# Patient Record
Sex: FEMALE | Race: WHITE | Employment: OTHER | URBAN - METROPOLITAN AREA
[De-identification: names, ages, dates, MRNs, and addresses within clinical notes are randomized per-mention and may not be internally consistent; named-entity substitution may affect disease eponyms.]

---

## 2017-04-19 ENCOUNTER — HOSPITAL ENCOUNTER (OUTPATIENT)
Dept: PHYSICAL THERAPY | Age: 62
Discharge: HOME | End: 2017-04-19
Payer: MEDICARE

## 2017-04-19 PROCEDURE — 97162 PT EVAL MOD COMPLEX 30 MIN: CPT

## 2017-04-19 PROCEDURE — 97140 MANUAL THERAPY 1/> REGIONS: CPT

## 2017-04-19 PROCEDURE — 97110 THERAPEUTIC EXERCISES: CPT

## 2017-04-19 PROCEDURE — G8978 MOBILITY CURRENT STATUS: HCPCS

## 2017-04-19 PROCEDURE — G8979 MOBILITY GOAL STATUS: HCPCS

## 2017-04-19 NOTE — PROGRESS NOTES
Ambulatory/Rehab Services H2 Model Falls Risk Assessment    Risk Factor Pts. ·   Confusion/Disorientation/Impulsivity  []    4 ·   Symptomatic Depression  []   2 ·   Altered Elimination  []   1 ·   Dizziness/Vertigo  []   1 ·   Gender (Male)  []   1 ·   Any administered antiepileptics (anticonvulsants):  []   2 ·   Any administered benzodiazepines:  []   1 ·   Visual Impairment (specify):  []   1 ·   Portable Oxygen Use  []   1 ·   Orthostatic ? BP  []   1 ·   History of Recent Falls (within 3 mos.)  []   5     Ability to Rise from Chair (choose one) Pts. ·   Ability to rise in a single movement  []   0 ·   Pushes up, successful in one attempt  [x]   1 ·   Multiple attempts, but successful  []   3 ·   Unable to rise without assistance  []   4   Total: (5 or greater = High Risk) 1     Falls Prevention Plan:   []                Physical Limitations to Exercise (specify):   []                Mobility Assistance Device (type):   []                Exercise/Equipment Adaptation (specify):    ©2010 [facility] Patent #[id number].  Federal Law prohibits the replication, distribution or use without written permission from [facility]

## 2017-04-19 NOTE — PROGRESS NOTES
Rina Westbrook  : 1955 [facility] [place] [address] [address].  [phone]   [phone]        OUTPATIENT PHYSICAL THERAPY:Initial Assessment 2017      ICD-10: Treatment Diagnosis:  Difficulty in walking, not elsewhere classified (R26.2)  Foot drop, right foot (M21.371)  Precautions/Allergies:   Zanaflex [tizanidine]   Fall Risk Score: 1 (? 5 = High Risk)  MD Orders: Evaluate and Treat MEDICAL/REFERRING DIAGNOSIS:  Encounter for other specified aftercare [Z51.89]  Cerebral aneurysm [I67.1]   DATE OF ONSET: chronic  REFERRING PHYSICIAN: Becca Estevez MD  RETURN PHYSICIAN APPOINTMENT: TBA     INITIAL ASSESSMENT:  Ms. Divya Gilmore (pt) is a 63year old white female seen for physical therapy per MD orders with complaint of decreased balance, decreased strength R foot and antalgic gait. .  Pt evaluated for outpatient physical therapy today with the following deficits: decreased strength/ ROM R foot, decreased balance and antalgic gait. Pt would benefit from physical therapy to address the above deficits in order to return to increased independence with functional mobility safely with improved balance. Pt would benefit from working in aquatic setting to address goals. Also, plan to transition to gravity challenging, land based exercises/ activities. Plan of care and goals reviewed with patient who verbalizes agreement. PROBLEM LIST (Impacting functional limitations):  1. Decreased Strength  2. Decreased Ambulation Ability/Technique  3. Decreased Balance  4. Decreased Flexibility/Joint Mobility INTERVENTIONS PLANNED:  1. Balance Exercise  2. Gait Training  3. Home Exercise Program (HEP)  4. Manual Therapy  5. Neuromuscular Re-education/Strengthening  6. Range of Motion (ROM)  7. Therapeutic Activites  8. Therapeutic Exercise/Strengthening  9. modalities   10. Aquatics   TREATMENT PLAN:  Effective Dates: 17 TO 17. Frequency/Duration:1-2 times a week for 12 weeks. GOALS: (Goals have been discussed and agreed upon with patient.)  Short-Term Functional Goals: Time Frame: 2 weeks  Patient will demonstrate independence and compliance with home exercise program for management of symptoms. Pt will demonstrate increase in Lower Extremity Functional Scale by 2-3 points for improved functional mobility. Pt will tolerate 35 to 40 minutes of aquatic therapy to address strength and balance right foot. Discharge Goals: Time Frame: 8 weeks  Pt will report Lower Extremity Functional Scale score of 51/80 for improved function. Pt will demonstrate improved active range of motion of right ankle dorsiflexion by 5 ° for normalized heel strike in gait sequence. Pt will ambulate independently  >/= 1000 feet with improved gait pattern- equal stance time and R LE heel strike at initial stance phase. Pt will demonstrate improved single leg stance on RLE by 4 to 6 secs for improved safety and falls prevention. Pt will demonstrate reciprocal gait up/ down 10-12 steps with independently with even stance time with use of unilateral handrail. Rehabilitation Potential For Stated Goals: Good  Regarding Jessa Lute therapy, I certify that the treatment plan above will be carried out by a therapist or under their direction. Thank you for this referral,  Shila Lacy, PT       Referring Physician Signature: Otto Saba MD              Date                      HISTORY:   History of Present Injury/Illness (Reason for Referral):  Pt is 62 y/o WF seen for PT per MD orders following brain aneurysm during surgical procedure in 2014 with significant residual R side paralysis/ limitations. PT had been seen for physical therapy and is familiar to this therapist for extensive rehab addressing foot drop/ gait in spring/ summer 2016.   Within a couple of weeks of therapy D/C in 2016 pt fell while taking dog out to bathroom, fracturing first three toes on R foot. Since her initial cerebral event in 2014 pt has been wearing some semblance of brace to help reduce foot drop and reports she is being evaluated for orthotic/ brace again in May. Pt reports her goals include being able to walk \"great\" to run, to dance and balance. Past Medical History/Comorbidities:   Ms. Divya Gilmore  has a past medical history of Anxiety disorder; Back pain; Brain aneurysm; Cerebral hemorrhage ([facility] [address] History of drug dependence ([facility] [address] History of vitamin D deficiency; Hypercholesterolemia; Joint pain; Personal history of colonic polyps; Stroke [facility]); and Unspecified late effects of cerebrovascular disease. She also has no past medical history of Adverse effect of anesthesia; Difficult intubation; Malignant hyperthermia due to anesthesia; Nausea & vomiting; or Pseudocholinesterase deficiency. Ms. Divya Gilmore  has a past surgical history that includes suzette and bso (1990); intracranial aneurysm repair (2014); colonoscopy; and knee arthroscopy. Social History/Living Environment:     Pt lives with  in 2 story home. Prior Level of Function/Work/Activity:  Previous history of therapy in spring/ summer 2016. Walking with significant foot drop (without ankle brace intact) without use of assistive device except in unfamiliar surroundings  Dominant Side:         LEFT  Current Medications:    Current Outpatient Prescriptions:     HYDROcodone-acetaminophen (NORCO) 7.5-325 mg per tablet, Take 1 Tab by mouth two (2) times a day. Max Daily Amount: 2 Tabs., Disp: 60 Tab, Rfl: 0    [START ON 5/4/2017] HYDROcodone-acetaminophen (NORCO) 7.5-325 mg per tablet, Take 1 Tab by mouth two (2) times a day. Max Daily Amount: 2 Tabs., Disp: 60 Tab, Rfl: 0    [START ON 6/4/2017] HYDROcodone-acetaminophen (NORCO) 7.5-325 mg per tablet, Take 1 Tab by mouth two (2) times a day. Max Daily Amount: 2 Tabs., Disp: 60 Tab, Rfl: 0    atorvastatin (LIPITOR) 20 mg tablet, Take 1 Tab by mouth daily.  Indications: hypercholesterolemia, Disp: 90 Tab, Rfl: 3    alendronate (FOSAMAX) 70 mg tablet, Take 1 Tab by mouth every seven (7) days. Indications: POST-MENOPAUSAL OSTEOPOROSIS, Disp: 12 Tab, Rfl: 3    gabapentin (NEURONTIN) 400 mg capsule, Take 1 Cap by mouth three (3) times daily. , Disp: 270 Cap, Rfl: 3    buPROPion XL (WELLBUTRIN XL) 150 mg tablet, Take 1 Tab by mouth every morning., Disp: 90 Tab, Rfl: 0    cholecalciferol (VITAMIN D3) 1,000 unit cap, Take 1 Cap by mouth nightly. Indications: VITAMIN D DEFICIENCY, Disp: , Rfl:     diclofenac (VOLTAREN) 1 % gel, Apply topically 4 (four) times a day. Apply 2 grams to painful areas up to four times daily, Disp: , Rfl:     Multivit-Iron-Min-Folic Acid (CENTRUM COMPLETE) 18-0.4 mg tab, Take  by mouth.  Stop seven days prior to surgery per anesthesia protocol., Disp: , Rfl:    Date Last Reviewed:  4/19/2017   # of Personal Factors/Comorbidities that affect the Plan of Care: 1-2: MODERATE COMPLEXITY   EXAMINATION:   Observation/Orthostatic Postural Assessment:          Pt of petite stature  Palpation:          No tenderness noted in R foot/ ankle however tightness \"roping\" and tenderness in lateral R calf  ROM:  BUE/ cervical WFL                     Date:  04/19/17 Date:   Date:     Trunk ROM WFL                          LE ROM Left Right       Hip Flexion [facility]       Hip Abduction [facility]       Straight Leg Raise (SLR) 90 ° 90 °                Knee Flexion [facility] WFL       Knee Extension [facility]                Ankle Inversion 35 ° 35 °       Ankle Eversion 20 ° 7 °       Ankle Planar Flexion 50 ° 40 °       Ankle Dorsiflexion 20 ° 5 °**       ** compensated with eversion as wel  Strength:     Date:  04/19/17 Date:   Date:     LE MMT Left Right Left Right Left Right   Hip Flex (L1/L2) 4/5 4/5       Hip Abd (glut med) 4/5 4/5       Hip Ext 4/5 4/5       Quad    ( L3/4) 4/5 4/5       Hamstring 4/5 4/5       Anterior Tib (L4/L5) 4/5 2/5**       Gastroc (S1/2) 4/5 2/5**       EHL (L5) 4/5 2/5**       ** indicates limited by AROM limitations                    Special Tests:  deferred  Neurological Screen:  Noted R side UE and LE with limited volitional control particularly in R foot/ ankle        Sensation: intact for light touch  Functional Mobility:         Gait/Ambulation:  Pt ambulating without A.D during assessment with noted foot drop (when observed without brace on R foot)  Pt imitates stance phase with flat foot- no heel strike on R foot- with cues able to improve however still lands in inversion/ supination        Transfers:  Pt demonstrates sit to stand sit without use of hands from standard seat- supervised for increased effort and loading equally on BLE        Bed Mobility:  independent        Stairs:  Pt demonstrates reciprocal gait on stairs however not even wt bearing/ stance time. Balance:          Single Leg Stance:  R 2-3sec  L) 13 sec   Body Structures Involved:  1. Nerves  2. Joints  3. Muscles  4. Ligaments Body Functions Affected:  1. Neuromusculoskeletal  2. Movement Related Activities and Participation Affected:  1. General Tasks and Demands  2. Mobility  3. Self Care  4. Domestic Life  5. Community, Social and [place]   # of elements that affect the Plan of Care: 3: MODERATE COMPLEXITY   CLINICAL PRESENTATION:   Presentation: Evolving clinical presentation with changing clinical characteristics: MODERATE COMPLEXITY   CLINICAL DECISION MAKING:   Outcome Measure: Tool Used: Lower Extremity Functional Scale (LEFS)  Score:  Initial: 46/80 Most Recent: X/80 (Date: -- )   Interpretation of Score: 20 questions each scored on a 5 point scale with 0 representing \"extreme difficulty or unable to perform\" and 4 representing \"no difficulty\". The lower the score, the greater the functional disability. 80/80 represents no disability. Minimal detectable change is 9 points. Score 80 79-63 62-48 47-32 31-16 15-1 0   Modifier CH CI CJ CK CL CM CN     ?  Mobility - Walking and Moving Around:     - CURRENT STATUS: CK - 40%-59% impaired, limited or restricted    - GOAL STATUS: CJ - 20%-39% impaired, limited or restricted    - D/C STATUS:  ---------------To be determined---------------    Medical Necessity:   · Patient is expected to demonstrate progress in strength, range of motion and balance to increase independence with functional mobility safely. Reason for Services/Other Comments:  · Patient continues to require modification of therapeutic interventions to increase complexity of exercises. Use of outcome tool(s) and clinical judgement create a POC that gives a: Questionable prediction of patient's progress: MODERATE COMPLEXITY   TREATMENT:   (In addition to Assessment/Re-Assessment sessions the following treatments were rendered)    Therapeutic Exercise: (see flow sheet below for minutes):  Exercises per grid below to improve mobility, strength and balance. Required minimal visual and verbal cues to promote proper body alignment and promote proper body mechanics. Progressed resistance, range and repetitions as indicated. Aquatic Therapy (see flow sheet below for minutes): Aquatic treatment performed per flow grid for Decreased muscle strength, Decreased static/dynamic balance and reactive control and Ease of movement. Cues provided for technique. Assistance by therapist provided for progression of activities/ exercises. Patient has difficulty with R ankle/ balance. Date: 04/19/17       Modalities:                                Manual Therapy: 15 min       STM to heel cord R heel cord                       Aquatic Exercise:        Gait F/B/S/M        Heel/Toe walk        4-way        PF/DF        Hamstring Curls        Hip Flexion with knee ext.   (rockette)        Squats          SLR march        Lunges        Hip circles        Hip horizontal abduction/adduction        Core:          Core:        Deep well bike        Deep well jumping georgie Deep well scissors        Deep well:  traction                Hamstring Stretch        Step Lunge        Piriformis stretch        PF Stretch        Balance: Single leg Stance        Balance: Tandem                          Therapeutic Exercise: 10 min       Toe/ heel raises x15 BLE  x5 L/R with cues       Heel cord stretch 2H20 BLE at bar       Eversion Seated with Red TB                               F=forward  B=Backward  S=sidesteps  M=marches    H=hold    Manual: (see flow sheet above for minutes) Pt positioned in supine for STM to R heelcord and laterally to peroneals with significant tightness noted  Following STM worked on gait with slightly improved heel strike  Modalities: (see flow sheet above for minutes)     HEP: Addition of single leg heel/ toe raises BID as well as instructed in use of red TB for eversion exercises- based on previous therapy intervention pt has had these exercises in past however reports \"I have forgotten them\"    Treatment/Session Assessment:  Initial assessment completed as well as pt participated in standing/ seated exercise which pt is familiar with based on previous therapeutic intervention. Pt tolerated STM to R posterior lower leg with noted slight improvement in facilitation of R heel strike at initial stance phase. Plan to see patient to instructed in HEP/ exercises/ activities addressing strength, ROM and balance for safety and falls risk with functional mobility. Pt is very ambitious with goals for dancing, running etc considering she fell after her most recent therapy intervention at peak of strength and balance work. Will plan to address strength/ balance and ROM to improve safety with functional mobility in daily routine. Pt in agreement with this plan. · Pain/ Symptoms: Initial:   1/10 in ankle  3-4/10 in posterior R calf Post Session:  1/10 ·   Compliance with Program/Exercises: Will assess as treatment progresses.   · Recommendations/Intent for next treatment session: \"Next visit will focus on advancements to more challenging activities\". Plan to work on land at next session will work in aquatic setting PRN pending tolerance to work on strength/ balance on land.     Total Treatment Duration:  PT Patient Time In/Time Out  Time In: 1030  Time Out: [address] [facility] BERE Martinez

## 2017-04-21 ENCOUNTER — HOSPITAL ENCOUNTER (OUTPATIENT)
Dept: PHYSICAL THERAPY | Age: 62
Discharge: HOME | End: 2017-04-21
Payer: MEDICARE

## 2017-04-21 PROCEDURE — 97110 THERAPEUTIC EXERCISES: CPT

## 2017-04-21 NOTE — PROGRESS NOTES
Yogesh Toscano  : [address] [address].  [phone]   [phone]        OUTPATIENT PHYSICAL THERAPY:Daily Note 2017      ICD-10: Treatment Diagnosis:  Difficulty in walking, not elsewhere classified (R26.2)  Foot drop, right foot (M21.371)  Precautions/Allergies:   Zanaflex [tizanidine]   Fall Risk Score: 1 (? 5 = High Risk)  MD Orders: Evaluate and Treat MEDICAL/REFERRING DIAGNOSIS:  Encounter for other specified aftercare [Z51.89]  Cerebral aneurysm [I67.1]   DATE OF ONSET: chronic  REFERRING PHYSICIAN: Immanuel Blanc MD  RETURN PHYSICIAN APPOINTMENT: TBA     INITIAL ASSESSMENT:  Ms. Timbo Paiz (pt) is a 63year old white female seen for physical therapy per MD orders with complaint of decreased balance, decreased strength R foot and antalgic gait. .  Pt evaluated for outpatient physical therapy today with the following deficits: decreased strength/ ROM R foot, decreased balance and antalgic gait. Pt would benefit from physical therapy to address the above deficits in order to return to increased independence with functional mobility safely with improved balance. Pt would benefit from working in aquatic setting to address goals. Also, plan to transition to gravity challenging, land based exercises/ activities. Plan of care and goals reviewed with patient who verbalizes agreement. PROBLEM LIST (Impacting functional limitations):  1. Decreased Strength  2. Decreased Ambulation Ability/Technique  3. Decreased Balance  4. Decreased Flexibility/Joint Mobility INTERVENTIONS PLANNED:  1. Balance Exercise  2. Gait Training  3. Home Exercise Program (HEP)  4. Manual Therapy  5. Neuromuscular Re-education/Strengthening  6. Range of Motion (ROM)  7. Therapeutic Activites  8. Therapeutic Exercise/Strengthening  9. modalities   10. Aquatics   TREATMENT PLAN:  Effective Dates: 17 TO 17.   Frequency/Duration:1-2 times a week for 12 weeks. GOALS: (Goals have been discussed and agreed upon with patient.)  Short-Term Functional Goals: Time Frame: 2 weeks  Patient will demonstrate independence and compliance with home exercise program for management of symptoms. Pt will demonstrate increase in Lower Extremity Functional Scale by 2-3 points for improved functional mobility. Pt will tolerate 35 to 40 minutes of aquatic therapy to address strength and balance right foot. Discharge Goals: Time Frame: 8 weeks  Pt will report Lower Extremity Functional Scale score of 51/80 for improved function. Pt will demonstrate improved active range of motion of right ankle dorsiflexion by 5 ° for normalized heel strike in gait sequence. Pt will ambulate independently  >/= 1000 feet with improved gait pattern- equal stance time and R LE heel strike at initial stance phase. Pt will demonstrate improved single leg stance on RLE by 4 to 6 secs for improved safety and falls prevention. Pt will demonstrate reciprocal gait up/ down 10-12 steps with independently with even stance time with use of unilateral handrail. Rehabilitation Potential For Stated Goals: Good                HISTORY:   History of Present Injury/Illness (Reason for Referral):  Pt is 62 y/o WF seen for PT per MD orders following brain aneurysm during surgical procedure in 2014 with significant residual R side paralysis/ limitations. PT had been seen for physical therapy and is familiar to this therapist for extensive rehab addressing foot drop/ gait in spring/ summer 2016. Within a couple of weeks of therapy D/C in 2016 pt fell while taking dog out to bathroom, fracturing first three toes on R foot. Since her initial cerebral event in 2014 pt has been wearing some semblance of brace to help reduce foot drop and reports she is being evaluated for orthotic/ brace again in May.   Pt reports her goals include being able to walk \"great\" to run, to dance and balance. Past Medical History/Comorbidities:   Ms. Mojgan Prater  has a past medical history of Anxiety disorder; Back pain; Brain aneurysm; Cerebral hemorrhage ([facility] [address] History of drug dependence ([facility] [address] History of vitamin D deficiency; Hypercholesterolemia; Joint pain; Personal history of colonic polyps; Stroke [facility]); and Unspecified late effects of cerebrovascular disease. She also has no past medical history of Adverse effect of anesthesia; Difficult intubation; Malignant hyperthermia due to anesthesia; Nausea & vomiting; or Pseudocholinesterase deficiency. Ms. Mojgan Prater  has a past surgical history that includes suzette and bso (1990); intracranial aneurysm repair (2014); colonoscopy; and knee arthroscopy. Social History/Living Environment:     Pt lives with  in 2 story home. Prior Level of Function/Work/Activity:  Previous history of therapy in spring/ summer 2016. Walking with significant foot drop (without ankle brace intact) without use of assistive device except in unfamiliar surroundings  Dominant Side:         LEFT  Current Medications:    Current Outpatient Prescriptions:     HYDROcodone-acetaminophen (NORCO) 7.5-325 mg per tablet, Take 1 Tab by mouth two (2) times a day. Max Daily Amount: 2 Tabs., Disp: 60 Tab, Rfl: 0    [START ON 5/4/2017] HYDROcodone-acetaminophen (NORCO) 7.5-325 mg per tablet, Take 1 Tab by mouth two (2) times a day. Max Daily Amount: 2 Tabs., Disp: 60 Tab, Rfl: 0    [START ON 6/4/2017] HYDROcodone-acetaminophen (NORCO) 7.5-325 mg per tablet, Take 1 Tab by mouth two (2) times a day. Max Daily Amount: 2 Tabs., Disp: 60 Tab, Rfl: 0    atorvastatin (LIPITOR) 20 mg tablet, Take 1 Tab by mouth daily. Indications: hypercholesterolemia, Disp: 90 Tab, Rfl: 3    alendronate (FOSAMAX) 70 mg tablet, Take 1 Tab by mouth every seven (7) days. Indications: POST-MENOPAUSAL OSTEOPOROSIS, Disp: 12 Tab, Rfl: 3    gabapentin (NEURONTIN) 400 mg capsule, Take 1 Cap by mouth three (3) times daily. , Disp: 270 Cap, Rfl: 3    buPROPion XL (WELLBUTRIN XL) 150 mg tablet, Take 1 Tab by mouth every morning., Disp: 90 Tab, Rfl: 0    cholecalciferol (VITAMIN D3) 1,000 unit cap, Take 1 Cap by mouth nightly. Indications: VITAMIN D DEFICIENCY, Disp: , Rfl:     diclofenac (VOLTAREN) 1 % gel, Apply topically 4 (four) times a day. Apply 2 grams to painful areas up to four times daily, Disp: , Rfl:     Multivit-Iron-Min-Folic Acid (CENTRUM COMPLETE) 18-0.4 mg tab, Take  by mouth.  Stop seven days prior to surgery per anesthesia protocol., Disp: , Rfl:    Date Last Reviewed:  4/21/2017   EXAMINATION:   Observation/Orthostatic Postural Assessment:          Pt of petite stature  Palpation:          No tenderness noted in R foot/ ankle however tightness \"roping\" and tenderness in lateral R calf  ROM:  BUE/ cervical WFL                     Date:  04/19/17 Date:   Date:     Trunk ROM WFL                          LE ROM Left Right       Hip Flexion [facility]       Hip Abduction [facility]       Straight Leg Raise (SLR) 90 ° 90 °                Knee Flexion [facility]       Knee Extension [facility]                Ankle Inversion 35 ° 35 °       Ankle Eversion 20 ° 7 °       Ankle Planar Flexion 50 ° 40 °       Ankle Dorsiflexion 20 ° 5 °**       ** compensated with eversion as wel  Strength:     Date:  04/19/17 Date:   Date:     LE MMT Left Right Left Right Left Right   Hip Flex (L1/L2) 4/5 4/5       Hip Abd (glut med) 4/5 4/5       Hip Ext 4/5 4/5       Quad    ( L3/4) 4/5 4/5       Hamstring 4/5 4/5       Anterior Tib (L4/L5) 4/5 2/5**       Gastroc (S1/2) 4/5 2/5**       EHL (L5) 4/5 2/5**       ** indicates limited by AROM limitations                    Special Tests:  deferred  Neurological Screen:  Noted R side UE and LE with limited volitional control particularly in R foot/ ankle        Sensation: intact for light touch  Functional Mobility:         Gait/Ambulation:  Pt ambulating without A.D during assessment with noted foot drop (when observed without brace on R foot)  Pt imitates stance phase with flat foot- no heel strike on R foot- with cues able to improve however still lands in inversion/ supination        Transfers:  Pt demonstrates sit to stand sit without use of hands from standard seat- supervised for increased effort and loading equally on BLE        Bed Mobility:  independent        Stairs:  Pt demonstrates reciprocal gait on stairs however not even wt bearing/ stance time. Balance:          Single Leg Stance:  R 2-3sec  L) 13 sec   Body Structures Involved:  1. Nerves  2. Joints  3. Muscles  4. Ligaments Body Functions Affected:  1. Neuromusculoskeletal  2. Movement Related Activities and Participation Affected:  1. General Tasks and Demands  2. Mobility  3. Self Care  4. Domestic Life  5. Community, Social and Civic Life   CLINICAL PRESENTATION:   CLINICAL DECISION MAKING:   Outcome Measure: Tool Used: Lower Extremity Functional Scale (LEFS)  Score:  Initial: 46/80 Most Recent: X/80 (Date: -- )   Interpretation of Score: 20 questions each scored on a 5 point scale with 0 representing \"extreme difficulty or unable to perform\" and 4 representing \"no difficulty\". The lower the score, the greater the functional disability. 80/80 represents no disability. Minimal detectable change is 9 points. Score 80 79-63 62-48 47-32 31-16 15-1 0   Modifier CH CI CJ CK CL CM CN     ? Mobility - Walking and Moving Around:     - CURRENT STATUS: CK - 40%-59% impaired, limited or restricted    - GOAL STATUS: CJ - 20%-39% impaired, limited or restricted    - D/C STATUS:  ---------------To be determined---------------    Medical Necessity:   · Patient is expected to demonstrate progress in strength, range of motion and balance to increase independence with functional mobility safely. Reason for Services/Other Comments:  · Patient continues to require modification of therapeutic interventions to increase complexity of exercises. TREATMENT:   (In addition to Assessment/Re-Assessment sessions the following treatments were rendered)    Therapeutic Exercise: (see flow sheet below for minutes):  Exercises per grid below to improve mobility, strength and balance. Required minimal visual and verbal cues to promote proper body alignment and promote proper body mechanics. Progressed resistance, range and repetitions as indicated. Aquatic Therapy (see flow sheet below for minutes): Aquatic treatment performed per flow grid for Decreased muscle strength, Decreased static/dynamic balance and reactive control and Ease of movement. Cues provided for technique. Assistance by therapist provided for progression of activities/ exercises. Patient has difficulty with R ankle/ balance. Date: 04/19/17 04/21/17      Modalities:                                Manual Therapy: 15 min 15 min      STM to heel cord R heel cord To R heel cord/ peroneals (laterally)                      Aquatic Exercise:        Gait F/B/S/M        Heel/Toe walk        4-way        PF/DF        Hamstring Curls        Hip Flexion with knee ext.   (rockette)        Squats          SLR march        Lunges        Hip circles        Hip horizontal abduction/adduction        Core:          Core:        Deep well bike        Deep well jumping georgie        Deep well scissors        Deep well:  traction                Hamstring Stretch        Step Lunge        Piriformis stretch        PF Stretch        Balance: Single leg Stance        Balance: Tandem                          Therapeutic Exercise: 10 min 40 min      Toe/ heel raises x15 BLE  x5 L/R with cues X15 BLE  X15 R/L singles      Heel cord stretch 2H20 BLE at bar       Eversion Seated with Red TB       TB (red) ankle ROM all directions  X15 RLE      Airex marching  x15      Airex Heel to toe  x15      Tandem Stance  2H20   2H20 airex      Single leg stance  2H20  2h20 airex with support      Gait training  2x20 ft tandem, hihg knee march, anna F/B/ alternate                      F=forward  B=Backward  S=sidesteps  M=marches    H=hold    Manual: (see flow sheet above for minutes) Pt positioned in supine for STM to R heelcord and laterally to peroneals with significant tightness noted  Following STM worked on gait with slightly improved heel strike  Modalities: (see flow sheet above for minutes)     HEP: Addition of single leg heel/ toe raises BID as well as instructed in use of red TB for eversion exercises- based on previous therapy intervention pt has had these exercises in past however reports \"I have forgotten them\"    Treatment/Session Assessment: Initiated session with STM for pain management and improved soft tissue mobility to facilitation of heel strike in initial stance phase. Exercises focused on R ankle strength in all directions as well as balance as seen with gait for improved ankle reaction times for falls prevention/ safety. Plan to gait train with ACE wrap to facilitate eversion at next visit-  Pt to be seen by orthotist in a few weeks and pt nervous about getting very invasive AFO or one that duplicates the many she has. · Pain/ Symptoms: Initial: 3-4/10 in posterior R calf Post Session:  1/10 ·   Compliance with Program/Exercises: Will assess as treatment progresses. Recommendations/Intent for next treatment session: \"Next visit will focus on advancements to more challenging activities\". Plan to alternate work on land/ pool.     Total Treatment Duration:  PT Patient Time In/Time Out  Time In: 1135  Time Out: [address] 68, PT

## 2017-04-26 ENCOUNTER — HOSPITAL ENCOUNTER (OUTPATIENT)
Dept: PHYSICAL THERAPY | Age: 62
Discharge: HOME | End: 2017-04-26
Payer: MEDICARE

## 2017-04-26 PROCEDURE — 97140 MANUAL THERAPY 1/> REGIONS: CPT

## 2017-04-26 PROCEDURE — 97110 THERAPEUTIC EXERCISES: CPT

## 2017-04-26 NOTE — PROGRESS NOTES
Edwina Chappell  : [address] [address].  [phone]   [phone]        OUTPATIENT PHYSICAL THERAPY:Daily Note 2017      ICD-10: Treatment Diagnosis:  Difficulty in walking, not elsewhere classified (R26.2)  Foot drop, right foot (M21.371)  Precautions/Allergies:   Zanaflex [tizanidine]   Fall Risk Score: 1 (? 5 = High Risk)  MD Orders: Evaluate and Treat MEDICAL/REFERRING DIAGNOSIS:  Encounter for other specified aftercare [Z51.89]  Cerebral aneurysm [I67.1]   DATE OF ONSET: chronic  REFERRING PHYSICIAN: Tank Riley MD  RETURN PHYSICIAN APPOINTMENT: TBA     INITIAL ASSESSMENT:  Ms. Ty Kim (pt) is a 63year old white female seen for physical therapy per MD orders with complaint of decreased balance, decreased strength R foot and antalgic gait. .  Pt evaluated for outpatient physical therapy today with the following deficits: decreased strength/ ROM R foot, decreased balance and antalgic gait. Pt would benefit from physical therapy to address the above deficits in order to return to increased independence with functional mobility safely with improved balance. Pt would benefit from working in aquatic setting to address goals. Also, plan to transition to gravity challenging, land based exercises/ activities. Plan of care and goals reviewed with patient who verbalizes agreement. PROBLEM LIST (Impacting functional limitations):  1. Decreased Strength  2. Decreased Ambulation Ability/Technique  3. Decreased Balance  4. Decreased Flexibility/Joint Mobility INTERVENTIONS PLANNED:  1. Balance Exercise  2. Gait Training  3. Home Exercise Program (HEP)  4. Manual Therapy  5. Neuromuscular Re-education/Strengthening  6. Range of Motion (ROM)  7. Therapeutic Activites  8. Therapeutic Exercise/Strengthening  9. modalities   10. Aquatics   TREATMENT PLAN:  Effective Dates: 17 TO 17.   Frequency/Duration:1-2 times a week for 12 weeks. GOALS: (Goals have been discussed and agreed upon with patient.)  Short-Term Functional Goals: Time Frame: 2 weeks  Patient will demonstrate independence and compliance with home exercise program for management of symptoms. Pt will demonstrate increase in Lower Extremity Functional Scale by 2-3 points for improved functional mobility. Pt will tolerate 35 to 40 minutes of aquatic therapy to address strength and balance right foot. Discharge Goals: Time Frame: 8 weeks  Pt will report Lower Extremity Functional Scale score of 51/80 for improved function. Pt will demonstrate improved active range of motion of right ankle dorsiflexion by 5 ° for normalized heel strike in gait sequence. Pt will ambulate independently  >/= 1000 feet with improved gait pattern- equal stance time and R LE heel strike at initial stance phase. Pt will demonstrate improved single leg stance on RLE by 4 to 6 secs for improved safety and falls prevention. Pt will demonstrate reciprocal gait up/ down 10-12 steps with independently with even stance time with use of unilateral handrail. Rehabilitation Potential For Stated Goals: Good                HISTORY:   History of Present Injury/Illness (Reason for Referral):  Pt is 64 y/o WF seen for PT per MD orders following brain aneurysm during surgical procedure in 2014 with significant residual R side paralysis/ limitations. PT had been seen for physical therapy and is familiar to this therapist for extensive rehab addressing foot drop/ gait in spring/ summer 2016. Within a couple of weeks of therapy D/C in 2016 pt fell while taking dog out to bathroom, fracturing first three toes on R foot. Since her initial cerebral event in 2014 pt has been wearing some semblance of brace to help reduce foot drop and reports she is being evaluated for orthotic/ brace again in May.   Pt reports her goals include being able to walk \"great\" to run, to dance and balance. Past Medical History/Comorbidities:   Ms. Divya Gilmore  has a past medical history of Anxiety disorder; Back pain; Brain aneurysm; Cerebral hemorrhage ([facility] [address] History of drug dependence ([facility] [address] History of vitamin D deficiency; Hypercholesterolemia; Joint pain; Personal history of colonic polyps; Stroke [facility]); and Unspecified late effects of cerebrovascular disease. She also has no past medical history of Adverse effect of anesthesia; Difficult intubation; Malignant hyperthermia due to anesthesia; Nausea & vomiting; or Pseudocholinesterase deficiency. Ms. Divya Gilmore  has a past surgical history that includes suzette and bso (1990); intracranial aneurysm repair (2014); colonoscopy; and knee arthroscopy. Social History/Living Environment:     Pt lives with  in 2 story home. Prior Level of Function/Work/Activity:  Previous history of therapy in spring/ summer 2016. Walking with significant foot drop (without ankle brace intact) without use of assistive device except in unfamiliar surroundings  Dominant Side:         LEFT  Current Medications:    Current Outpatient Prescriptions:     HYDROcodone-acetaminophen (NORCO) 7.5-325 mg per tablet, Take 1 Tab by mouth two (2) times a day. Max Daily Amount: 2 Tabs., Disp: 60 Tab, Rfl: 0    [START ON 5/4/2017] HYDROcodone-acetaminophen (NORCO) 7.5-325 mg per tablet, Take 1 Tab by mouth two (2) times a day. Max Daily Amount: 2 Tabs., Disp: 60 Tab, Rfl: 0    [START ON 6/4/2017] HYDROcodone-acetaminophen (NORCO) 7.5-325 mg per tablet, Take 1 Tab by mouth two (2) times a day. Max Daily Amount: 2 Tabs., Disp: 60 Tab, Rfl: 0    atorvastatin (LIPITOR) 20 mg tablet, Take 1 Tab by mouth daily. Indications: hypercholesterolemia, Disp: 90 Tab, Rfl: 3    alendronate (FOSAMAX) 70 mg tablet, Take 1 Tab by mouth every seven (7) days. Indications: POST-MENOPAUSAL OSTEOPOROSIS, Disp: 12 Tab, Rfl: 3    gabapentin (NEURONTIN) 400 mg capsule, Take 1 Cap by mouth three (3) times daily. , Disp: 270 Cap, Rfl: 3    buPROPion XL (WELLBUTRIN XL) 150 mg tablet, Take 1 Tab by mouth every morning., Disp: 90 Tab, Rfl: 0    cholecalciferol (VITAMIN D3) 1,000 unit cap, Take 1 Cap by mouth nightly. Indications: VITAMIN D DEFICIENCY, Disp: , Rfl:     diclofenac (VOLTAREN) 1 % gel, Apply topically 4 (four) times a day. Apply 2 grams to painful areas up to four times daily, Disp: , Rfl:     Multivit-Iron-Min-Folic Acid (CENTRUM COMPLETE) 18-0.4 mg tab, Take  by mouth.  Stop seven days prior to surgery per anesthesia protocol., Disp: , Rfl:    Date Last Reviewed:  4/26/2017   EXAMINATION:   Observation/Orthostatic Postural Assessment:          Pt of petite stature  Palpation:          No tenderness noted in R foot/ ankle however tightness \"roping\" and tenderness in lateral R calf  ROM:  BUE/ cervical WFL                     Date:  04/19/17 Date:   Date:     Trunk ROM WFL                          LE ROM Left Right       Hip Flexion [facility]       Hip Abduction [facility]       Straight Leg Raise (SLR) 90 ° 90 °                Knee Flexion [facility]       Knee Extension [facility]                Ankle Inversion 35 ° 35 °       Ankle Eversion 20 ° 7 °       Ankle Planar Flexion 50 ° 40 °       Ankle Dorsiflexion 20 ° 5 °**       ** compensated with eversion as wel  Strength:     Date:  04/19/17 Date:   Date:     LE MMT Left Right Left Right Left Right   Hip Flex (L1/L2) 4/5 4/5       Hip Abd (glut med) 4/5 4/5       Hip Ext 4/5 4/5       Quad    ( L3/4) 4/5 4/5       Hamstring 4/5 4/5       Anterior Tib (L4/L5) 4/5 2/5**       Gastroc (S1/2) 4/5 2/5**       EHL (L5) 4/5 2/5**       ** indicates limited by AROM limitations                    Special Tests:  deferred  Neurological Screen:  Noted R side UE and LE with limited volitional control particularly in R foot/ ankle        Sensation: intact for light touch  Functional Mobility:         Gait/Ambulation:  Pt ambulating without A.D during assessment with noted foot drop (when observed without brace on R foot)  Pt imitates stance phase with flat foot- no heel strike on R foot- with cues able to improve however still lands in inversion/ supination        Transfers:  Pt demonstrates sit to stand sit without use of hands from standard seat- supervised for increased effort and loading equally on BLE        Bed Mobility:  independent        Stairs:  Pt demonstrates reciprocal gait on stairs however not even wt bearing/ stance time. Balance:          Single Leg Stance:  R 2-3sec  L) 13 sec   Body Structures Involved:  1. Nerves  2. Joints  3. Muscles  4. Ligaments Body Functions Affected:  1. Neuromusculoskeletal  2. Movement Related Activities and Participation Affected:  1. General Tasks and Demands  2. Mobility  3. Self Care  4. Domestic Life  5. Community, Social and Civic Life   CLINICAL PRESENTATION:   CLINICAL DECISION MAKING:   Outcome Measure: Tool Used: Lower Extremity Functional Scale (LEFS)  Score:  Initial: 46/80 Most Recent: X/80 (Date: -- )   Interpretation of Score: 20 questions each scored on a 5 point scale with 0 representing \"extreme difficulty or unable to perform\" and 4 representing \"no difficulty\". The lower the score, the greater the functional disability. 80/80 represents no disability. Minimal detectable change is 9 points. Score 80 79-63 62-48 47-32 31-16 15-1 0   Modifier CH CI CJ CK CL CM CN     ? Mobility - Walking and Moving Around:     - CURRENT STATUS: CK - 40%-59% impaired, limited or restricted    - GOAL STATUS: CJ - 20%-39% impaired, limited or restricted    - D/C STATUS:  ---------------To be determined---------------    Medical Necessity:   · Patient is expected to demonstrate progress in strength, range of motion and balance to increase independence with functional mobility safely. Reason for Services/Other Comments:  · Patient continues to require modification of therapeutic interventions to increase complexity of exercises. TREATMENT:   (In addition to Assessment/Re-Assessment sessions the following treatments were rendered)    Therapeutic Exercise: (see flow sheet below for minutes):  Exercises per grid below to improve mobility, strength and balance. Required minimal visual and verbal cues to promote proper body alignment and promote proper body mechanics. Progressed resistance, range and repetitions as indicated. Aquatic Therapy (see flow sheet below for minutes): Aquatic treatment performed per flow grid for Decreased muscle strength, Decreased static/dynamic balance and reactive control and Ease of movement. Cues provided for technique. Assistance by therapist provided for progression of activities/ exercises. Patient has difficulty with R ankle/ balance. Date: 04/19/17 04/21/17 4/26/17     Modalities:                                Manual Therapy: 15 min 15 min 20 min     STM to heel cord R heel cord To R heel cord/ peroneals (laterally) R heel cord/calf/peroneals 15     PROM PF/DF, INV/EV   5 min             Aquatic Exercise:        Gait F/B/S/M        Heel/Toe walk        4-way        PF/DF        Hamstring Curls        Hip Flexion with knee ext.   (rockette)        Squats          SLR march        Lunges        Hip circles        Hip horizontal abduction/adduction        Core:          Core:        Deep well bike        Deep well jumping georgie        Deep well scissors        Deep well:  traction                Hamstring Stretch        Step Lunge        Piriformis stretch        PF Stretch        Balance: Single leg Stance        Balance: Tandem                          Therapeutic Exercise: 10 min 40 min 40 min     Toe/ heel raises x15 BLE  x5 L/R with cues X15 BLE  X15 R/L singles x15 BLE ea     Heel cord stretch 2H20 BLE at bar  slantboard 2H30     Eversion Seated with Red TB       TB (red) ankle ROM all directions  X15 RLE 4 way x15 red TB RLE     Airex marching  x15 x15     Airex Heel to toe  x15 x15 Tandem Stance  2H20   2H20 airex      Single leg stance  2H20  2h20 airex with support      Gait training  2x20 ft tandem, hihg knee march, [place] F/B/ alternate x75 ft even ground     Eccentric 2 to 1   RLE x15     LAQ   x15 BLE     Seated march   x15 BLE                                     F=forward  B=Backward  S=sidesteps  M=marches    H=hold    Manual: (see flow sheet above for minutes) Pt positioned in supine for STM to R heelcord and laterally to peroneals with significant tightness noted      HEP: Addition of single leg heel/ toe raises BID as well as instructed in use of red TB for eversion exercises- based on previous therapy intervention pt has had these exercises in past however reports \"I have forgotten them\"    Treatment/Session Assessment: Began with STM noting most tightness in R peroneal region, followed with PROM stretches. Continued land based exercises and tolerated well. Demonstrates decreased strength which is contributing to instability with gait pattern. Plan to progress as tolerated, aquatics next session. Also plan to try J strip heel lift in pt R tennis shoe in order to decrease eversion with weight acceptance during gait. · Pain/ Symptoms: Initial: 0/10  Post Session:  0/10 ·   Compliance with Program/Exercises: Will assess as treatment progresses. Recommendations/Intent for next treatment session: \"Next visit will focus on advancements to more challenging activities\". Plan to alternate work on land/ pool.     Total Treatment Duration:  PT Patient Time In/Time Out  Time In: 1325  Time Out: [address], [place]

## 2017-04-28 ENCOUNTER — HOSPITAL ENCOUNTER (OUTPATIENT)
Dept: PHYSICAL THERAPY | Age: 62
Discharge: HOME | End: 2017-04-28
Payer: MEDICARE

## 2017-04-28 PROCEDURE — 97110 THERAPEUTIC EXERCISES: CPT

## 2017-04-28 PROCEDURE — 97113 AQUATIC THERAPY/EXERCISES: CPT

## 2017-04-28 NOTE — PROGRESS NOTES
Mirna Freitas  : [address] [address].  [phone]   [phone]        OUTPATIENT PHYSICAL THERAPY:Daily Note 2017      ICD-10: Treatment Diagnosis:  Difficulty in walking, not elsewhere classified (R26.2)  Foot drop, right foot (M21.371)  Precautions/Allergies:   Zanaflex [tizanidine]   Fall Risk Score: 1 (? 5 = High Risk)  MD Orders: Evaluate and Treat MEDICAL/REFERRING DIAGNOSIS:  Encounter for other specified aftercare [Z51.89]  Cerebral aneurysm [I67.1]   DATE OF ONSET: chronic  REFERRING PHYSICIAN: Rocio Thomas MD  RETURN PHYSICIAN APPOINTMENT: TBA     INITIAL ASSESSMENT:  Ms. Gris Manzano (pt) is a 63year old white female seen for physical therapy per MD orders with complaint of decreased balance, decreased strength R foot and antalgic gait. .  Pt evaluated for outpatient physical therapy today with the following deficits: decreased strength/ ROM R foot, decreased balance and antalgic gait. Pt would benefit from physical therapy to address the above deficits in order to return to increased independence with functional mobility safely with improved balance. Pt would benefit from working in aquatic setting to address goals. Also, plan to transition to gravity challenging, land based exercises/ activities. Plan of care and goals reviewed with patient who verbalizes agreement. PROBLEM LIST (Impacting functional limitations):  1. Decreased Strength  2. Decreased Ambulation Ability/Technique  3. Decreased Balance  4. Decreased Flexibility/Joint Mobility INTERVENTIONS PLANNED:  1. Balance Exercise  2. Gait Training  3. Home Exercise Program (HEP)  4. Manual Therapy  5. Neuromuscular Re-education/Strengthening  6. Range of Motion (ROM)  7. Therapeutic Activites  8. Therapeutic Exercise/Strengthening  9. modalities   10. Aquatics   TREATMENT PLAN:  Effective Dates: 17 TO 17.   Frequency/Duration:1-2 times a week for 12 weeks. GOALS: (Goals have been discussed and agreed upon with patient.)  Short-Term Functional Goals: Time Frame: 2 weeks  Patient will demonstrate independence and compliance with home exercise program for management of symptoms. Pt will demonstrate increase in Lower Extremity Functional Scale by 2-3 points for improved functional mobility. Pt will tolerate 35 to 40 minutes of aquatic therapy to address strength and balance right foot. Discharge Goals: Time Frame: 8 weeks  Pt will report Lower Extremity Functional Scale score of 51/80 for improved function. Pt will demonstrate improved active range of motion of right ankle dorsiflexion by 5 ° for normalized heel strike in gait sequence. Pt will ambulate independently  >/= 1000 feet with improved gait pattern- equal stance time and R LE heel strike at initial stance phase. Pt will demonstrate improved single leg stance on RLE by 4 to 6 secs for improved safety and falls prevention. Pt will demonstrate reciprocal gait up/ down 10-12 steps with independently with even stance time with use of unilateral handrail. Rehabilitation Potential For Stated Goals: Good                HISTORY:   History of Present Injury/Illness (Reason for Referral):  Pt is 62 y/o WF seen for PT per MD orders following brain aneurysm during surgical procedure in 2014 with significant residual R side paralysis/ limitations. PT had been seen for physical therapy and is familiar to this therapist for extensive rehab addressing foot drop/ gait in spring/ summer 2016. Within a couple of weeks of therapy D/C in 2016 pt fell while taking dog out to bathroom, fracturing first three toes on R foot. Since her initial cerebral event in 2014 pt has been wearing some semblance of brace to help reduce foot drop and reports she is being evaluated for orthotic/ brace again in May.   Pt reports her goals include being able to walk \"great\" to run, to dance and balance. Past Medical History/Comorbidities:   Ms. Barbara Conner  has a past medical history of Anxiety disorder; Back pain; Brain aneurysm; Cerebral hemorrhage ([facility] [address] History of drug dependence ([facility] [address] History of vitamin D deficiency; Hypercholesterolemia; Joint pain; Personal history of colonic polyps; Stroke [facility]); and Unspecified late effects of cerebrovascular disease. She also has no past medical history of Adverse effect of anesthesia; Difficult intubation; Malignant hyperthermia due to anesthesia; Nausea & vomiting; or Pseudocholinesterase deficiency. Ms. Barbara Conner  has a past surgical history that includes suzette and bso (1990); intracranial aneurysm repair (2014); colonoscopy; and knee arthroscopy. Social History/Living Environment:     Pt lives with  in 2 story home. Prior Level of Function/Work/Activity:  Previous history of therapy in spring/ summer 2016. Walking with significant foot drop (without ankle brace intact) without use of assistive device except in unfamiliar surroundings  Dominant Side:         LEFT  Current Medications:    Current Outpatient Prescriptions:     HYDROcodone-acetaminophen (NORCO) 7.5-325 mg per tablet, Take 1 Tab by mouth two (2) times a day. Max Daily Amount: 2 Tabs., Disp: 60 Tab, Rfl: 0    [START ON 5/4/2017] HYDROcodone-acetaminophen (NORCO) 7.5-325 mg per tablet, Take 1 Tab by mouth two (2) times a day. Max Daily Amount: 2 Tabs., Disp: 60 Tab, Rfl: 0    [START ON 6/4/2017] HYDROcodone-acetaminophen (NORCO) 7.5-325 mg per tablet, Take 1 Tab by mouth two (2) times a day. Max Daily Amount: 2 Tabs., Disp: 60 Tab, Rfl: 0    atorvastatin (LIPITOR) 20 mg tablet, Take 1 Tab by mouth daily. Indications: hypercholesterolemia, Disp: 90 Tab, Rfl: 3    alendronate (FOSAMAX) 70 mg tablet, Take 1 Tab by mouth every seven (7) days. Indications: POST-MENOPAUSAL OSTEOPOROSIS, Disp: 12 Tab, Rfl: 3    gabapentin (NEURONTIN) 400 mg capsule, Take 1 Cap by mouth three (3) times daily. , Disp: 270 Cap, Rfl: 3    buPROPion XL (WELLBUTRIN XL) 150 mg tablet, Take 1 Tab by mouth every morning., Disp: 90 Tab, Rfl: 0    cholecalciferol (VITAMIN D3) 1,000 unit cap, Take 1 Cap by mouth nightly. Indications: VITAMIN D DEFICIENCY, Disp: , Rfl:     diclofenac (VOLTAREN) 1 % gel, Apply topically 4 (four) times a day. Apply 2 grams to painful areas up to four times daily, Disp: , Rfl:     Multivit-Iron-Min-Folic Acid (CENTRUM COMPLETE) 18-0.4 mg tab, Take  by mouth.  Stop seven days prior to surgery per anesthesia protocol., Disp: , Rfl:    Date Last Reviewed:  4/28/2017   EXAMINATION:   Observation/Orthostatic Postural Assessment:          Pt of petite stature  Palpation:          No tenderness noted in R foot/ ankle however tightness \"roping\" and tenderness in lateral R calf  ROM:  BUE/ cervical WFL                     Date:  04/19/17 Date:   Date:     Trunk ROM WFL                          LE ROM Left Right       Hip Flexion [facility]       Hip Abduction [facility]       Straight Leg Raise (SLR) 90 ° 90 °                Knee Flexion [facility] WF       Knee Extension [facility] WF                Ankle Inversion 35 ° 35 °       Ankle Eversion 20 ° 7 °       Ankle Planar Flexion 50 ° 40 °       Ankle Dorsiflexion 20 ° 5 °**       ** compensated with eversion as weil  Strength:     Date:  04/19/17 Date:   Date:     LE MMT Left Right Left Right Left Right   Hip Flex (L1/L2) 4/5 4/5       Hip Abd (glut med) 4/5 4/5       Hip Ext 4/5 4/5       Quad    ( L3/4) 4/5 4/5       Hamstring 4/5 4/5       Anterior Tib (L4/L5) 4/5 2/5**       Gastroc (S1/2) 4/5 2/5**       EHL (L5) 4/5 2/5**       ** indicates limited by AROM limitations                    Special Tests:  deferred  Neurological Screen:  Noted R side UE and LE with limited volitional control particularly in R foot/ ankle        Sensation: intact for light touch  Functional Mobility:         Gait/Ambulation:  Pt ambulating without A.D during assessment with noted foot drop (when observed without brace on R foot)  Pt imitates stance phase with flat foot- no heel strike on R foot- with cues able to improve however still lands in inversion/ supination        Transfers:  Pt demonstrates sit to stand sit without use of hands from standard seat- supervised for increased effort and loading equally on BLE        Bed Mobility:  independent        Stairs:  Pt demonstrates reciprocal gait on stairs however not even wt bearing/ stance time. Balance:          Single Leg Stance:  R 2-3sec  L) 13 sec   Body Structures Involved:  1. Nerves  2. Joints  3. Muscles  4. Ligaments Body Functions Affected:  1. Neuromusculoskeletal  2. Movement Related Activities and Participation Affected:  1. General Tasks and Demands  2. Mobility  3. Self Care  4. Domestic Life  5. Community, Social and Civic Life   CLINICAL PRESENTATION:   CLINICAL DECISION MAKING:   Outcome Measure: Tool Used: Lower Extremity Functional Scale (LEFS)  Score:  Initial: 46/80 Most Recent: X/80 (Date: -- )   Interpretation of Score: 20 questions each scored on a 5 point scale with 0 representing \"extreme difficulty or unable to perform\" and 4 representing \"no difficulty\". The lower the score, the greater the functional disability. 80/80 represents no disability. Minimal detectable change is 9 points. Score 80 79-63 62-48 47-32 31-16 15-1 0   Modifier CH CI CJ CK CL CM CN     ? Mobility - Walking and Moving Around:     - CURRENT STATUS: CK - 40%-59% impaired, limited or restricted    - GOAL STATUS: CJ - 20%-39% impaired, limited or restricted    - D/C STATUS:  ---------------To be determined---------------    Medical Necessity:   · Patient is expected to demonstrate progress in strength, range of motion and balance to increase independence with functional mobility safely. Reason for Services/Other Comments:  · Patient continues to require modification of therapeutic interventions to increase complexity of exercises. TREATMENT:   (In addition to Assessment/Re-Assessment sessions the following treatments were rendered)    Therapeutic Exercise: (see flow sheet below for minutes):  Exercises per grid below to improve mobility, strength and balance. Required minimal visual and verbal cues to promote proper body alignment and promote proper body mechanics. Progressed resistance, range and repetitions as indicated. Aquatic Therapy (see flow sheet below for minutes): Aquatic treatment performed per flow grid for Decreased muscle strength, Decreased static/dynamic balance and reactive control and Ease of movement. Cues provided for technique. Assistance by therapist provided for progression of activities/ exercises. Patient has difficulty with R ankle/ balance. Date: 04/19/17 04/21/17 4/26/17 04/228/17    Modalities:                                Manual Therapy: 15 min 15 min 20 min     STM to heel cord R heel cord To R heel cord/ peroneals (laterally) R heel cord/calf/peroneals 15     PROM PF/DF, INV/EV   5 min             Aquatic Exercise:    45 min    Gait F/B/S/M    X4L each    Heel/Toe walk        4-way        PF/DF    x15    Hamstring Curls        Hip Flexion with knee ext.   (rockette)        Squats      x15    SLR march    x4LK    Lunges    X20 BLE    Hip circles        Hip horizontal abduction/adduction        Core:          Core:        Deep well bike        Deep well jumping georgie        Deep well scissors        Deep well:  traction        Toe/ heel raises with Wonderboard    X15 R/L    Single leg marching with Wonderboard    X15 BLE    Hamstring Stretch    2H30 BLE    Heel cord stretch    2H30 BLE    Step Lunge        Piriformis stretch        PF Stretch        Balance: Single leg Stance        Balance: Tandem                          Therapeutic Exercise: 10 min 40 min 40 min 15 min    Toe/ heel raises x15 BLE  x5 L/R with cues X15 BLE  X15 R/L singles x15 BLE ea     Heel cord stretch 2H20 BLE at bar  slant board 9P13     Eversion Seated with Red TB       TB (red) ankle ROM all directions  X15 RLE 4 way x15 red TB RLE     Airex marching  x15 x15     Airex Heel to toe  x15 x15     Tandem Stance  2H20   2H20 airex      Single leg stance  2H20  2h20 airex with support      Gait training  2x20 ft tandem, high knee march, [place] F/B/ alternate x75 ft even ground X15 min with heel lifts of varying density     Eccentric 2 to 1   RLE x15     LAQ   x15 BLE     Seated march   x15 BLE                                     F=forward  B=Backward  S=sidesteps  M=marches    H=hold    Manual: (see flow sheet above for minutes)     HEP: Addition of single leg heel/ toe raises BID as well as instructed in use of red TB for eversion exercises- based on previous therapy intervention pt has had these exercises in past however reports \"I have forgotten them\"    Treatment/Session Assessment: Initiated aquatics this visit with 2.5# on BLE- focus on normalized gait as well as ankle strength/ proprioception  With working on [employer]- pt with difficulty with volitional control of R ankle likely related to CVA affects. Plan to progress as tolerated utilizing land/ aquatics as needed in next session. Also address gait training on land with J strip heel lift in pt R tennis shoe (attempted several different ones of varying density)in order to decrease eversion with weight acceptance during gait- difficulty with toe clearance more so with denser foam) will cont to work on gait training in session- did not give insert to pt at this time. · Pain/ Symptoms: Initial: 0/10  Post Session:  0/10 ·   Compliance with Program/Exercises: Will assess as treatment progresses. Recommendations/Intent for next treatment session: \"Next visit will focus on advancements to more challenging activities\". Plan to alternate work on land/ pool.     Total Treatment Duration:  PT Patient Time In/Time Out  Time In: 1030  Time Out: [address] [facility] BERE Martinez

## 2017-05-03 ENCOUNTER — HOSPITAL ENCOUNTER (OUTPATIENT)
Dept: PHYSICAL THERAPY | Age: 62
Discharge: HOME | End: 2017-05-03
Payer: MEDICARE

## 2017-05-03 NOTE — PROGRESS NOTES
Pt cancelled physical therapy today due to illness. Will plan to resume PT at next scheduled visit to address goals/ POC.

## 2017-05-05 ENCOUNTER — HOSPITAL ENCOUNTER (OUTPATIENT)
Dept: PHYSICAL THERAPY | Age: 62
Discharge: HOME | End: 2017-05-05
Payer: MEDICARE

## 2017-05-05 PROCEDURE — 97140 MANUAL THERAPY 1/> REGIONS: CPT

## 2017-05-05 PROCEDURE — 97110 THERAPEUTIC EXERCISES: CPT

## 2017-05-05 NOTE — PROGRESS NOTES
Kia Paul  : [id number] [facility],[address] @ [address]  [facility] [address]  [phone]   [phone]        OUTPATIENT PHYSICAL THERAPY:Daily Note 2017      ICD-10: Treatment Diagnosis:  Difficulty in walking, not elsewhere classified (R26.2)  Foot drop, right foot (M21.371)  Precautions/Allergies:   Zanaflex [tizanidine]   Fall Risk Score: 1 (? 5 = High Risk)  MD Orders: Evaluate and Treat MEDICAL/REFERRING DIAGNOSIS:  Encounter for other specified aftercare [Z51.89]  Cerebral aneurysm [I67.1]   DATE OF ONSET: chronic  REFERRING PHYSICIAN: Sen Marks MD  RETURN PHYSICIAN APPOINTMENT: TBA     INITIAL ASSESSMENT:  Ms. Bobby Yap (pt) is a 63year old white female seen for physical therapy per MD orders with complaint of decreased balance, decreased strength R foot and antalgic gait. .  Pt evaluated for outpatient physical therapy today with the following deficits: decreased strength/ ROM R foot, decreased balance and antalgic gait. Pt would benefit from physical therapy to address the above deficits in order to return to increased independence with functional mobility safely with improved balance. Pt would benefit from working in aquatic setting to address goals. Also, plan to transition to gravity challenging, land based exercises/ activities. Plan of care and goals reviewed with patient who verbalizes agreement. PROBLEM LIST (Impacting functional limitations):  1. Decreased Strength  2. Decreased Ambulation Ability/Technique  3. Decreased Balance  4. Decreased Flexibility/Joint Mobility INTERVENTIONS PLANNED:  1. Balance Exercise  2. Gait Training  3. Home Exercise Program (HEP)  4. Manual Therapy  5. Neuromuscular Re-education/Strengthening  6. Range of Motion (ROM)  7. Therapeutic Activites  8. Therapeutic Exercise/Strengthening  9. modalities   10. Aquatics   TREATMENT PLAN:  Effective Dates: 17 TO 17.   Frequency/Duration:1-2 times a week for 12 weeks. GOALS: (Goals have been discussed and agreed upon with patient.)  Short-Term Functional Goals: Time Frame: 2 weeks  Patient will demonstrate independence and compliance with home exercise program for management of symptoms. Pt will demonstrate increase in Lower Extremity Functional Scale by 2-3 points for improved functional mobility. Pt will tolerate 35 to 40 minutes of aquatic therapy to address strength and balance right foot. Discharge Goals: Time Frame: 8 weeks  Pt will report Lower Extremity Functional Scale score of 51/80 for improved function. Pt will demonstrate improved active range of motion of right ankle dorsiflexion by 5 ° for normalized heel strike in gait sequence. Pt will ambulate independently  >/= 1000 feet with improved gait pattern- equal stance time and R LE heel strike at initial stance phase. Pt will demonstrate improved single leg stance on RLE by 4 to 6 secs for improved safety and falls prevention. Pt will demonstrate reciprocal gait up/ down 10-12 steps with independently with even stance time with use of unilateral handrail. Rehabilitation Potential For Stated Goals: Good                HISTORY:   History of Present Injury/Illness (Reason for Referral):  Pt is 62 y/o WF seen for PT per MD orders following brain aneurysm during surgical procedure in 2014 with significant residual R side paralysis/ limitations. PT had been seen for physical therapy and is familiar to this therapist for extensive rehab addressing foot drop/ gait in spring/ summer 2016. Within a couple of weeks of therapy D/C in 2016 pt fell while taking dog out to bathroom, fracturing first three toes on R foot. Since her initial cerebral event in 2014 pt has been wearing some semblance of brace to help reduce foot drop and reports she is being evaluated for orthotic/ brace again in May.   Pt reports her goals include being able to walk \"great\" to run, to dance and balance. Past Medical History/Comorbidities:   Ms. Anitra Truong  has a past medical history of Anxiety disorder; Back pain; Brain aneurysm; Cerebral hemorrhage ([facility] [address] History of drug dependence ([facility] [address] History of vitamin D deficiency; Hypercholesterolemia; Joint pain; Personal history of colonic polyps; Stroke [facility]); and Unspecified late effects of cerebrovascular disease. She also has no past medical history of Adverse effect of anesthesia; Difficult intubation; Malignant hyperthermia due to anesthesia; Nausea & vomiting; or Pseudocholinesterase deficiency. Ms. Anitra Truong  has a past surgical history that includes suzette and bso (1990); intracranial aneurysm repair (2014); colonoscopy; and knee arthroscopy. Social History/Living Environment:     Pt lives with  in 2 story home. Prior Level of Function/Work/Activity:  Previous history of therapy in spring/ summer 2016. Walking with significant foot drop (without ankle brace intact) without use of assistive device except in unfamiliar surroundings  Dominant Side:         LEFT  Current Medications:    Current Outpatient Prescriptions:     HYDROcodone-acetaminophen (NORCO) 7.5-325 mg per tablet, Take 1 Tab by mouth two (2) times a day. Max Daily Amount: 2 Tabs., Disp: 60 Tab, Rfl: 0    HYDROcodone-acetaminophen (NORCO) 7.5-325 mg per tablet, Take 1 Tab by mouth two (2) times a day. Max Daily Amount: 2 Tabs., Disp: 60 Tab, Rfl: 0    [START ON 6/4/2017] HYDROcodone-acetaminophen (NORCO) 7.5-325 mg per tablet, Take 1 Tab by mouth two (2) times a day. Max Daily Amount: 2 Tabs., Disp: 60 Tab, Rfl: 0    atorvastatin (LIPITOR) 20 mg tablet, Take 1 Tab by mouth daily. Indications: hypercholesterolemia, Disp: 90 Tab, Rfl: 3    alendronate (FOSAMAX) 70 mg tablet, Take 1 Tab by mouth every seven (7) days. Indications: POST-MENOPAUSAL OSTEOPOROSIS, Disp: 12 Tab, Rfl: 3    gabapentin (NEURONTIN) 400 mg capsule, Take 1 Cap by mouth three (3) times daily. , Disp: 270 Cap, Rfl: 3   buPROPion XL (WELLBUTRIN XL) 150 mg tablet, Take 1 Tab by mouth every morning., Disp: 90 Tab, Rfl: 0    cholecalciferol (VITAMIN D3) 1,000 unit cap, Take 1 Cap by mouth nightly. Indications: VITAMIN D DEFICIENCY, Disp: , Rfl:     diclofenac (VOLTAREN) 1 % gel, Apply topically 4 (four) times a day. Apply 2 grams to painful areas up to four times daily, Disp: , Rfl:     Multivit-Iron-Min-Folic Acid (CENTRUM COMPLETE) 18-0.4 mg tab, Take  by mouth.  Stop seven days prior to surgery per anesthesia protocol., Disp: , Rfl:    Date Last Reviewed:  5/5/2017   EXAMINATION:   Observation/Orthostatic Postural Assessment:          Pt of petite stature  Palpation:          No tenderness noted in R foot/ ankle however tightness \"roping\" and tenderness in lateral R calf  ROM:  BUE/ cervical WFL                     Date:  04/19/17 Date:   Date:     Trunk ROM WFL                          LE ROM Left Right       Hip Flexion [facility]       Hip Abduction [facility]       Straight Leg Raise (SLR) 90 ° 90 °                Knee Flexion [facility]       Knee Extension [facility]                Ankle Inversion 35 ° 35 °       Ankle Eversion 20 ° 7 °       Ankle Planar Flexion 50 ° 40 °       Ankle Dorsiflexion 20 ° 5 °**       ** compensated with eversion as weil  Strength:     Date:  04/19/17 Date:   Date:     LE MMT Left Right Left Right Left Right   Hip Flex (L1/L2) 4/5 4/5       Hip Abd (glut med) 4/5 4/5       Hip Ext 4/5 4/5       Quad    ( L3/4) 4/5 4/5       Hamstring 4/5 4/5       Anterior Tib (L4/L5) 4/5 2/5**       Gastroc (S1/2) 4/5 2/5**       EHL (L5) 4/5 2/5**       ** indicates limited by AROM limitations                    Special Tests:  deferred  Neurological Screen:  Noted R side UE and LE with limited volitional control particularly in R foot/ ankle        Sensation: intact for light touch  Functional Mobility:         Gait/Ambulation:  Pt ambulating without A.D during assessment with noted foot drop (when observed without brace on R foot)  Pt imitates stance phase with flat foot- no heel strike on R foot- with cues able to improve however still lands in inversion/ supination        Transfers:  Pt demonstrates sit to stand sit without use of hands from standard seat- supervised for increased effort and loading equally on BLE        Bed Mobility:  independent        Stairs:  Pt demonstrates reciprocal gait on stairs however not even wt bearing/ stance time. Balance:          Single Leg Stance:  R 2-3sec  L) 13 sec   Body Structures Involved:  1. Nerves  2. Joints  3. Muscles  4. Ligaments Body Functions Affected:  1. Neuromusculoskeletal  2. Movement Related Activities and Participation Affected:  1. General Tasks and Demands  2. Mobility  3. Self Care  4. Domestic Life  5. Community, Social and Civic Life   CLINICAL PRESENTATION:   CLINICAL DECISION MAKING:   Outcome Measure: Tool Used: Lower Extremity Functional Scale (LEFS)  Score:  Initial: 46/80 Most Recent: X/80 (Date: -- )   Interpretation of Score: 20 questions each scored on a 5 point scale with 0 representing \"extreme difficulty or unable to perform\" and 4 representing \"no difficulty\". The lower the score, the greater the functional disability. 80/80 represents no disability. Minimal detectable change is 9 points. Score 80 79-63 62-48 47-32 31-16 15-1 0   Modifier CH CI CJ CK CL CM CN     ? Mobility - Walking and Moving Around:     - CURRENT STATUS: CK - 40%-59% impaired, limited or restricted    - GOAL STATUS: CJ - 20%-39% impaired, limited or restricted    - D/C STATUS:  ---------------To be determined---------------    Medical Necessity:   · Patient is expected to demonstrate progress in strength, range of motion and balance to increase independence with functional mobility safely. Reason for Services/Other Comments:  · Patient continues to require modification of therapeutic interventions to increase complexity of exercises.    TREATMENT:   (In addition to Assessment/Re-Assessment sessions the following treatments were rendered)    Therapeutic Exercise: (see flow sheet below for minutes):  Exercises per grid below to improve mobility, strength and balance. Required minimal visual and verbal cues to promote proper body alignment and promote proper body mechanics. Progressed resistance, range and repetitions as indicated. Aquatic Therapy (see flow sheet below for minutes): Aquatic treatment performed per flow grid for Decreased muscle strength, Decreased static/dynamic balance and reactive control and Ease of movement. Cues provided for technique. Assistance by therapist provided for progression of activities/ exercises. Patient has difficulty with R ankle/ balance. Date: 04/19/17 04/21/17 4/26/17 04/28/17 05/05/17   Modalities:                                Manual Therapy: 15 min 15 min 20 min  15 min   STM to heel cord R heel cord To R heel cord/ peroneals (laterally) R heel cord/calf/peroneals 15     PROM PF/DF, INV/EV   5 min             Aquatic Exercise:    45 min    Gait F/B/S/M    X4L each    Heel/Toe walk        4-way        PF/DF    x15    Hamstring Curls        Hip Flexion with knee ext.   (rockette)        Squats      x15    SLR march    x4LK    Lunges    X20 BLE    Hip circles        Hip horizontal abduction/adduction        Core:          Core:        Deep well bike        Deep well jumping georgie        Deep well scissors        Deep well:  traction        Toe/ heel raises with Wonderboard    X15 R/L    Single leg marching with Wonderboard    X15 BLE    Hamstring Stretch    2H30 BLE    Heel cord stretch    2H30 BLE    Step Lunge        Piriformis stretch        PF Stretch        Balance: Single leg Stance        Balance: Tandem                          Therapeutic Exercise: 10 min 40 min 40 min 15 min 45 min   Toe/ heel raises x15 BLE  x5 L/R with cues X15 BLE  X15 R/L singles x15 BLE ea  X15 BLE on floor/ AIREX   Heel cord stretch 2H20 BLE at bar  slant board 2H30  2H30 BLE on slant board   Eversion Seated with Red TB       TB (red) ankle ROM all directions  X15 RLE 4 way x15 red TB RLE  X15 R ankle with red TB   Airex marching  x15 x15  X15 BLE   Airex Heel to toe  x15 x15  X15 BLE   Tandem Stance  2H20   2H20 airex      Single leg stance  2H20  2h20 airex with support      Gait training  2x20 ft tandem, high knee march, [place] F/B/ alternate x75 ft even ground X15 min with heel lifts of varying density  X10 min with new brace to promote proper alignment   Eccentric 2 to 1   RLE x15  X15 RLE   LAQ   x15 BLE  2x15 BLE 3#   Seated march   x15 BLE  2x15 BLE                                   F=forward  B=Backward  S=sidesteps  M=marches    H=hold    Manual: (see flow sheet above for minutes)     HEP: Addition of single leg heel/ toe raises BID as well as instructed in use of red TB for eversion exercises- based on previous therapy intervention pt has had these exercises in past however reports \"I have forgotten them\"    Treatment/Session Assessment:  Worked on land today- pt not feeling well (called to cancel previous appt) so requested land with focus on strength quality of movement of RLE. Pt struggles with volitional control of RLE even with R hip as seen with marching with purposeful placement on RLE. Pt did walk with more neutral R foot placement with new brace intact. Plan to continue to alternate land/ pool at next visit. · Pain/ Symptoms: Initial: 0/10 Pt came into clinic with new brace- just from prosthetics appt. Velcro brace pulls foot into more neutral alignment Post Session:  0/10 ·   Compliance with Program/Exercises: Will assess as treatment progresses. Recommendations/Intent for next treatment session: \"Next visit will focus on advancements to more challenging activities\". Plan to alternate work on land/ pool.     Total Treatment Duration:  PT Patient Time In/Time Out  Time In: 1130  Time Out: [address] 68, PT

## 2017-05-10 ENCOUNTER — HOSPITAL ENCOUNTER (OUTPATIENT)
Dept: PHYSICAL THERAPY | Age: 62
Discharge: HOME | End: 2017-05-10
Payer: MEDICARE

## 2017-05-10 PROCEDURE — G8979 MOBILITY GOAL STATUS: HCPCS

## 2017-05-10 PROCEDURE — 97113 AQUATIC THERAPY/EXERCISES: CPT

## 2017-05-10 PROCEDURE — G8978 MOBILITY CURRENT STATUS: HCPCS

## 2017-05-10 NOTE — PROGRESS NOTES
Wandatati Best  : [address] [address].  [phone]   [phone]        OUTPATIENT PHYSICAL THERAPY:Daily Note and Progress Report 5/10/2017      ICD-10: Treatment Diagnosis:  Difficulty in walking, not elsewhere classified (R26.2)  Foot drop, right foot (M21.371)  Precautions/Allergies:   Zanaflex [tizanidine]   Fall Risk Score: 1 (? 5 = High Risk)  MD Orders: Evaluate and Treat MEDICAL/REFERRING DIAGNOSIS:  Encounter for other specified aftercare [Z51.89]  Cerebral aneurysm [I67.1]   DATE OF ONSET: chronic  REFERRING PHYSICIAN: eJna Lock MD  RETURN PHYSICIAN APPOINTMENT: TBA     INITIAL ASSESSMENT:  Ms. Patton (pt) is a 63year old white female seen for physical therapy per MD orders with complaint of decreased balance, decreased strength R foot and antalgic gait. .  Pt evaluated for outpatient physical therapy today with the following deficits: decreased strength/ ROM R foot, decreased balance and antalgic gait. Pt would benefit from physical therapy to address the above deficits in order to return to increased independence with functional mobility safely with improved balance. Pt would benefit from working in aquatic setting to address goals. Also, plan to transition to gravity challenging, land based exercises/ activities. Plan of care and goals reviewed with patient who verbalizes agreement. PROBLEM LIST (Impacting functional limitations):  1. Decreased Strength  2. Decreased Ambulation Ability/Technique  3. Decreased Balance  4. Decreased Flexibility/Joint Mobility INTERVENTIONS PLANNED:  1. Balance Exercise  2. Gait Training  3. Home Exercise Program (HEP)  4. Manual Therapy  5. Neuromuscular Re-education/Strengthening  6. Range of Motion (ROM)  7. Therapeutic Activites  8. Therapeutic Exercise/Strengthening  9. modalities   10. Aquatics   TREATMENT PLAN:  Effective Dates: 17 TO 17. Frequency/Duration:1-2 times a week for 12 weeks. GOALS: (Goals have been discussed and agreed upon with patient.)  Short-Term Functional Goals: Time Frame: 2 weeks  Patient will demonstrate independence and compliance with home exercise program for management of symptoms. (met)  Pt will demonstrate increase in Lower Extremity Functional Scale by 2-3 points for improved functional mobility. (met)  Pt will tolerate 35 to 40 minutes of aquatic therapy to address strength and balance right foot. (met)      Discharge Goals: Time Frame: 8 weeks  Pt will report Lower Extremity Functional Scale score of 51/80 for improved function. (in progress)  Pt will demonstrate improved active range of motion of right ankle dorsiflexion by 5 ° for normalized heel strike in gait sequence. (in progress)  Pt will ambulate independently  >/= 1000 feet with improved gait pattern- equal stance time and R LE heel strike at initial stance phase. (in progress)  Pt will demonstrate improved single leg stance on RLE by 4 to 6 secs for improved safety and falls prevention.  (in progress)  Pt will demonstrate reciprocal gait up/ down 10-12 steps with independently with even stance time with use of unilateral handrail. (in progress)    Rehabilitation Potential For Stated Goals: Good                HISTORY:   History of Present Injury/Illness (Reason for Referral):  Pt is 64 y/o WF seen for PT per MD orders following brain aneurysm during surgical procedure in 2014 with significant residual R side paralysis/ limitations. PT had been seen for physical therapy and is familiar to this therapist for extensive rehab addressing foot drop/ gait in spring/ summer 2016. Within a couple of weeks of therapy D/C in 2016 pt fell while taking dog out to bathroom, fracturing first three toes on R foot.   Since her initial cerebral event in 2014 pt has been wearing some semblance of brace to help reduce foot drop and reports she is being evaluated for orthotic/ brace again in May. Pt reports her goals include being able to walk \"great\" to run, to dance and balance. Past Medical History/Comorbidities:   Ms. Idania Johnson  has a past medical history of Anxiety disorder; Back pain; Brain aneurysm; Cerebral hemorrhage ([facility] [address] History of drug dependence ([facility] [address] History of vitamin D deficiency; Hypercholesterolemia; Joint pain; Personal history of colonic polyps; Stroke [facility]); and Unspecified late effects of cerebrovascular disease. She also has no past medical history of Adverse effect of anesthesia; Difficult intubation; Malignant hyperthermia due to anesthesia; Nausea & vomiting; or Pseudocholinesterase deficiency. Ms. Idania Johnson  has a past surgical history that includes suzette and bso (1990); intracranial aneurysm repair (2014); colonoscopy; and knee arthroscopy. Social History/Living Environment:     Pt lives with  in 2 story home. Prior Level of Function/Work/Activity:  Previous history of therapy in spring/ summer 2016. Walking with significant foot drop (without ankle brace intact) without use of assistive device except in unfamiliar surroundings  Dominant Side:         LEFT  Current Medications:    Current Outpatient Prescriptions:     HYDROcodone-acetaminophen (NORCO) 7.5-325 mg per tablet, Take 1 Tab by mouth two (2) times a day. Max Daily Amount: 2 Tabs., Disp: 60 Tab, Rfl: 0    HYDROcodone-acetaminophen (NORCO) 7.5-325 mg per tablet, Take 1 Tab by mouth two (2) times a day. Max Daily Amount: 2 Tabs., Disp: 60 Tab, Rfl: 0    [START ON 6/4/2017] HYDROcodone-acetaminophen (NORCO) 7.5-325 mg per tablet, Take 1 Tab by mouth two (2) times a day. Max Daily Amount: 2 Tabs., Disp: 60 Tab, Rfl: 0    atorvastatin (LIPITOR) 20 mg tablet, Take 1 Tab by mouth daily. Indications: hypercholesterolemia, Disp: 90 Tab, Rfl: 3    alendronate (FOSAMAX) 70 mg tablet, Take 1 Tab by mouth every seven (7) days.  Indications: POST-MENOPAUSAL OSTEOPOROSIS, Disp: 12 Tab, Rfl: 3    gabapentin (NEURONTIN) 400 mg capsule, Take 1 Cap by mouth three (3) times daily. , Disp: 270 Cap, Rfl: 3    buPROPion XL (WELLBUTRIN XL) 150 mg tablet, Take 1 Tab by mouth every morning., Disp: 90 Tab, Rfl: 0    cholecalciferol (VITAMIN D3) 1,000 unit cap, Take 1 Cap by mouth nightly. Indications: VITAMIN D DEFICIENCY, Disp: , Rfl:     diclofenac (VOLTAREN) 1 % gel, Apply topically 4 (four) times a day. Apply 2 grams to painful areas up to four times daily, Disp: , Rfl:     Multivit-Iron-Min-Folic Acid (CENTRUM COMPLETE) 18-0.4 mg tab, Take  by mouth.  Stop seven days prior to surgery per anesthesia protocol., Disp: , Rfl:    Date Last Reviewed:  5/10/2017   EXAMINATION:   Observation/Orthostatic Postural Assessment:          Pt of petite stature  Palpation:          No tenderness noted in R foot/ ankle however tightness \"roping\" and tenderness in lateral R calf  ROM:  BUE/ cervical WFL                     Date:  04/19/17 Date:  05/10/17 Date:     Trunk ROM WFL                          LE ROM Left Right       Hip Flexion [facility]       Hip Abduction [facility]       Straight Leg Raise (SLR) 90 ° 90 °                Knee Flexion [facility]       Knee Extension [facility] WFL                Ankle Inversion 35 ° 35 ° 35 35     Ankle Eversion 20 ° 7 ° 20 7     Ankle Planar Flexion 50 ° 40 ° 50 40     Ankle Dorsiflexion 20 ° 5 °** 20 5     ** compensated with eversion as weil  Strength:     Date:  04/19/17 Date:  05/10/17 Date:     LE MMT Left Right Left Right Left Right   Hip Flex (L1/L2) 4/5 4/5       Hip Abd (glut med) 4/5 4/5       Hip Ext 4/5 4/5       Quad    ( L3/4) 4/5 4/5       Hamstring 4/5 4/5       Anterior Tib (L4/L5) 4/5 2/5**  2+/5     Gastroc (S1/2) 4/5 2/5**  2+/5     EHL (L5) 4/5 2/5**  2+/5     ** indicates limited by AROM limitations                    Special Tests:  deferred  Neurological Screen:  Noted R side UE and LE with limited volitional control particularly in R foot/ ankle        Sensation: intact for light touch  Functional Mobility:         Gait/Ambulation:  Pt ambulating without A.D during assessment with noted foot drop (when observed without brace on R foot) Pt recently fitted for new soft brace by orthotist to help facilitate improved R foot alignment. Transfers:  Pt demonstrates sit to stand sit without use of hands from standard seat- supervised for increased effort and loading equally on BLE        Bed Mobility:  independent        Stairs:  Pt demonstrates reciprocal gait on stairs however not even wt bearing/ stance time. Balance:          Single Leg Stance:  R 2-3sec  L) 13 sec   Body Structures Involved:  1. Nerves  2. Joints  3. Muscles  4. Ligaments Body Functions Affected:  1. Neuromusculoskeletal  2. Movement Related Activities and Participation Affected:  1. General Tasks and Demands  2. Mobility  3. Self Care  4. Domestic Life  5. Community, Social and Civic Life   CLINICAL PRESENTATION:   CLINICAL DECISION MAKING:   Outcome Measure: Tool Used: Lower Extremity Functional Scale (LEFS)  Score:  Initial: 46/80 Most Recent: 50/80 (Date: 05/10/17 )   Interpretation of Score: 20 questions each scored on a 5 point scale with 0 representing \"extreme difficulty or unable to perform\" and 4 representing \"no difficulty\". The lower the score, the greater the functional disability. 80/80 represents no disability. Minimal detectable change is 9 points. Score 80 79-63 62-48 47-32 31-16 15-1 0   Modifier CH CI CJ CK CL CM CN     ? Mobility - Walking and Moving Around:     - CURRENT STATUS: CJ - 20%-39% impaired, limited or restricted    - GOAL STATUS: CJ - 20%-39% impaired, limited or restricted    - D/C STATUS:  ---------------To be determined---------------    Medical Necessity:   · Patient is expected to demonstrate progress in strength, range of motion and balance to increase independence with functional mobility safely.   Reason for Services/Other Comments:  · Patient continues to require modification of therapeutic interventions to increase complexity of exercises. TREATMENT:   (In addition to Assessment/Re-Assessment sessions the following treatments were rendered)    Therapeutic Exercise: (see flow sheet below for minutes):  Exercises per grid below to improve mobility, strength and balance. Required minimal visual and verbal cues to promote proper body alignment and promote proper body mechanics. Progressed resistance, range and repetitions as indicated. Aquatic Therapy (see flow sheet below for minutes): Aquatic treatment performed per flow grid for Decreased muscle strength, Decreased static/dynamic balance and reactive control and Ease of movement. Cues provided for technique. Assistance by therapist provided for progression of activities/ exercises. Patient has difficulty with R ankle/ balance. Date: 04/19/17 04/21/17 4/26/17 04/28/17 05/05/17 05/10/17   Modalities:                                    Manual Therapy: 15 min 15 min 20 min  15 min    STM to heel cord R heel cord To R heel cord/ peroneals (laterally) R heel cord/calf/peroneals 15      PROM PF/DF, INV/EV   5 min               Aquatic Exercise:    45 min  60 min   Gait F/B/S/M    X4L each  x4L each F/B/S with dumb bells   Heel/Toe walk         4-way         PF/DF    x15  x20 BLE  x20 2 to 1 RLE   Hamstring Curls         Hip Flexion with knee ext.   (rockette)      x20 BLE   Squats      x15  x20   SLR march    x4LK  x4L   Lunges    X20 BLE  x4L   Hip circles         Hip horizontal abduction/adduction         Skipping (step hop)      x4L   Jogging       x20 slow christiano for coordination   Lateral skipping      x4L   Core:           Core:         Deep well bike      3 min   Deep well jumping georgie      2 min   Deep well scissors      2 min   Deep well:  traction         Deep well:  AROM Ankles      X15 each direction   Toe/ heel raises with Wonderboard    X15 R/L     Single leg marching with Wonderboard    X15 BLE Hamstring Stretch    2H30 BLE  2H30 BLE   Heel cord stretch    2H30 BLE  2H30 BLE   Step Lunge         Piriformis stretch         PF Stretch         Balance: Single leg Stance         Balance: Tandem                             Therapeutic Exercise: 10 min 40 min 40 min 15 min 45 min    Toe/ heel raises x15 BLE  x5 L/R with cues X15 BLE  X15 R/L singles x15 BLE ea  X15 BLE on floor/ AIREX    Heel cord stretch 2H20 BLE at bar  slant board 2H30  2H30 BLE on slant board    Eversion Seated with Red TB        TB (red) ankle ROM all directions  X15 RLE 4 way x15 red TB RLE  X15 R ankle with red TB    Airex marching  x15 x15  X15 BLE    Airex Heel to toe  x15 x15  X15 BLE    Tandem Stance  2H20   2H20 airex       Single leg stance  2H20  2h20 airex with support       Gait training  2x20 ft tandem, high knee march, [place] F/B/ alternate x75 ft even ground X15 min with heel lifts of varying density  X10 min with new brace to promote proper alignment    Eccentric 2 to 1   RLE x15  X15 RLE    LAQ   x15 BLE  2x15 BLE 3#    Seated march   x15 BLE  2x15 BLE                                        F=forward  B=Backward  S=sidesteps  M=marches    H=hold    Manual: (see flow sheet above for minutes)     HEP: Addition of single leg heel/ toe raises BID as well as instructed in use of red TB for eversion exercises- based on previous therapy intervention pt has had these exercises in past however reports \"I have forgotten them\"    Treatment/Session Assessment:  Worked in water today. Pt cont to struggles with volitional control of RLE- particularly with quick change in direction movements. Progress note written today with STG met and plan to continue for a few more weeks to address LTG. Plan to continue to alternate land/ pool at next visit. · Pain/ Symptoms: Initial: 0/10  Pt reports her new brace is irritating skin so she is gradually increasing wear time Post Session:  0/10 ·   Compliance with Program/Exercises:  Will assess as treatment progresses. Recommendations/Intent for next treatment session: \"Next visit will focus on advancements to more challenging activities\". Plan to alternate work on land/ pool.     Total Treatment Duration:  PT Patient Time In/Time Out  Time In: 1030  Time Out: [address] [facility] Juan, PT

## 2017-05-12 ENCOUNTER — HOSPITAL ENCOUNTER (OUTPATIENT)
Dept: PHYSICAL THERAPY | Age: 62
Discharge: HOME | End: 2017-05-12
Payer: MEDICARE

## 2017-05-12 NOTE — PROGRESS NOTES
Pt cancelled physical therapy today due to recovery from dental porcedure. Will plan to resume PT at next scheduled visit to address goals/ POC.

## 2017-05-17 ENCOUNTER — HOSPITAL ENCOUNTER (OUTPATIENT)
Dept: CT IMAGING | Age: 62
Discharge: HOME | End: 2017-05-17
Attending: FAMILY MEDICINE
Payer: MEDICARE

## 2017-05-17 DIAGNOSIS — Z72.0 TOBACCO ABUSE: ICD-10-CM

## 2017-05-17 PROCEDURE — G0297 LDCT FOR LUNG CA SCREEN: HCPCS

## 2017-05-19 ENCOUNTER — HOSPITAL ENCOUNTER (OUTPATIENT)
Dept: PHYSICAL THERAPY | Age: 62
Discharge: HOME | End: 2017-05-19
Payer: MEDICARE

## 2017-05-19 PROCEDURE — 97110 THERAPEUTIC EXERCISES: CPT

## 2017-05-19 NOTE — PROGRESS NOTES
Lars Yoon  : 1955 [id number] [facility],[address] @ [address]  [facility] [address]  [phone]   [phone]        OUTPATIENT PHYSICAL THERAPY:Daily Note 2017      ICD-10: Treatment Diagnosis:  Difficulty in walking, not elsewhere classified (R26.2)  Foot drop, right foot (M21.371)  Precautions/Allergies:   Zanaflex [tizanidine]   Fall Risk Score: 1 (? 5 = High Risk)  MD Orders: Evaluate and Treat MEDICAL/REFERRING DIAGNOSIS:  Encounter for other specified aftercare [Z51.89]  Cerebral aneurysm [I67.1]   DATE OF ONSET: chronic  REFERRING PHYSICIAN: Khurram Champion MD  RETURN PHYSICIAN APPOINTMENT: TBA     INITIAL ASSESSMENT:  Ms. Tisha Brandt (pt) is a 63year old white female seen for physical therapy per MD orders with complaint of decreased balance, decreased strength R foot and antalgic gait. .  Pt evaluated for outpatient physical therapy today with the following deficits: decreased strength/ ROM R foot, decreased balance and antalgic gait. Pt would benefit from physical therapy to address the above deficits in order to return to increased independence with functional mobility safely with improved balance. Pt would benefit from working in aquatic setting to address goals. Also, plan to transition to gravity challenging, land based exercises/ activities. Plan of care and goals reviewed with patient who verbalizes agreement. PROBLEM LIST (Impacting functional limitations):  1. Decreased Strength  2. Decreased Ambulation Ability/Technique  3. Decreased Balance  4. Decreased Flexibility/Joint Mobility INTERVENTIONS PLANNED:  1. Balance Exercise  2. Gait Training  3. Home Exercise Program (HEP)  4. Manual Therapy  5. Neuromuscular Re-education/Strengthening  6. Range of Motion (ROM)  7. Therapeutic Activites  8. Therapeutic Exercise/Strengthening  9. modalities   10. Aquatics   TREATMENT PLAN:  Effective Dates: 17 TO 17.   Frequency/Duration:1-2 times a week for 12 weeks. GOALS: (Goals have been discussed and agreed upon with patient.)  Short-Term Functional Goals: Time Frame: 2 weeks  Patient will demonstrate independence and compliance with home exercise program for management of symptoms. (met)  Pt will demonstrate increase in Lower Extremity Functional Scale by 2-3 points for improved functional mobility. (met)  Pt will tolerate 35 to 40 minutes of aquatic therapy to address strength and balance right foot. (met)      Discharge Goals: Time Frame: 8 weeks  Pt will report Lower Extremity Functional Scale score of 51/80 for improved function. (in progress)  Pt will demonstrate improved active range of motion of right ankle dorsiflexion by 5 ° for normalized heel strike in gait sequence. (in progress)  Pt will ambulate independently  >/= 1000 feet with improved gait pattern- equal stance time and R LE heel strike at initial stance phase. (in progress)  Pt will demonstrate improved single leg stance on RLE by 4 to 6 secs for improved safety and falls prevention.  (in progress)  Pt will demonstrate reciprocal gait up/ down 10-12 steps with independently with even stance time with use of unilateral handrail. (in progress)    Rehabilitation Potential For Stated Goals: Good                HISTORY:   History of Present Injury/Illness (Reason for Referral):  Pt is 62 y/o WF seen for PT per MD orders following brain aneurysm during surgical procedure in 2014 with significant residual R side paralysis/ limitations. PT had been seen for physical therapy and is familiar to this therapist for extensive rehab addressing foot drop/ gait in spring/ summer 2016. Within a couple of weeks of therapy D/C in 2016 pt fell while taking dog out to bathroom, fracturing first three toes on R foot. Since her initial cerebral event in 2014 pt has been wearing some semblance of brace to help reduce foot drop and reports she is being evaluated for orthotic/ brace again in May.   Pt reports her goals include being able to walk \"great\" to run, to dance and balance. Past Medical History/Comorbidities:   Ms. Duane Porter  has a past medical history of Anxiety disorder; Back pain; Brain aneurysm; Cerebral hemorrhage ([facility] [address] History of drug dependence ([facility] [address] History of vitamin D deficiency; Hypercholesterolemia; Joint pain; Personal history of colonic polyps; Stroke [facility]); and Unspecified late effects of cerebrovascular disease. She also has no past medical history of Adverse effect of anesthesia; Difficult intubation; Malignant hyperthermia due to anesthesia; Nausea & vomiting; or Pseudocholinesterase deficiency. Ms. Duane Porter  has a past surgical history that includes suzetet and bso (1990); intracranial aneurysm repair (2014); colonoscopy; and knee arthroscopy. Social History/Living Environment:     Pt lives with  in 2 story home. Prior Level of Function/Work/Activity:  Previous history of therapy in spring/ summer 2016. Walking with significant foot drop (without ankle brace intact) without use of assistive device except in unfamiliar surroundings  Dominant Side:         LEFT  Current Medications:    Current Outpatient Prescriptions:     HYDROcodone-acetaminophen (NORCO) 7.5-325 mg per tablet, Take 1 Tab by mouth two (2) times a day. Max Daily Amount: 2 Tabs., Disp: 60 Tab, Rfl: 0    HYDROcodone-acetaminophen (NORCO) 7.5-325 mg per tablet, Take 1 Tab by mouth two (2) times a day. Max Daily Amount: 2 Tabs., Disp: 60 Tab, Rfl: 0    [START ON 6/4/2017] HYDROcodone-acetaminophen (NORCO) 7.5-325 mg per tablet, Take 1 Tab by mouth two (2) times a day. Max Daily Amount: 2 Tabs., Disp: 60 Tab, Rfl: 0    atorvastatin (LIPITOR) 20 mg tablet, Take 1 Tab by mouth daily. Indications: hypercholesterolemia, Disp: 90 Tab, Rfl: 3    alendronate (FOSAMAX) 70 mg tablet, Take 1 Tab by mouth every seven (7) days.  Indications: POST-MENOPAUSAL OSTEOPOROSIS, Disp: 12 Tab, Rfl: 3    gabapentin (NEURONTIN) 400 mg capsule, Take 1 Cap by mouth three (3) times daily. , Disp: 270 Cap, Rfl: 3    buPROPion XL (WELLBUTRIN XL) 150 mg tablet, Take 1 Tab by mouth every morning., Disp: 90 Tab, Rfl: 0    cholecalciferol (VITAMIN D3) 1,000 unit cap, Take 1 Cap by mouth nightly. Indications: VITAMIN D DEFICIENCY, Disp: , Rfl:     diclofenac (VOLTAREN) 1 % gel, Apply topically 4 (four) times a day. Apply 2 grams to painful areas up to four times daily, Disp: , Rfl:     Multivit-Iron-Min-Folic Acid (CENTRUM COMPLETE) 18-0.4 mg tab, Take  by mouth.  Stop seven days prior to surgery per anesthesia protocol., Disp: , Rfl:    Date Last Reviewed:  5/19/2017   EXAMINATION:   Observation/Orthostatic Postural Assessment:          Pt of petite stature  Palpation:          No tenderness noted in R foot/ ankle however tightness \"roping\" and tenderness in lateral R calf  ROM:  BUE/ cervical WFL                     Date:  04/19/17 Date:  05/10/17 Date:     Trunk ROM WFL                          LE ROM Left Right       Hip Flexion [facility]       Hip Abduction [facility]       Straight Leg Raise (SLR) 90 ° 90 °                Knee Flexion [facility]       Knee Extension [facility] WFL                Ankle Inversion 35 ° 35 ° 35 35     Ankle Eversion 20 ° 7 ° 20 7     Ankle Planar Flexion 50 ° 40 ° 50 40     Ankle Dorsiflexion 20 ° 5 °** 20 5     ** compensated with eversion as weil  Strength:     Date:  04/19/17 Date:  05/10/17 Date:     LE MMT Left Right Left Right Left Right   Hip Flex (L1/L2) 4/5 4/5       Hip Abd (glut med) 4/5 4/5       Hip Ext 4/5 4/5       Quad    ( L3/4) 4/5 4/5       Hamstring 4/5 4/5       Anterior Tib (L4/L5) 4/5 2/5**  2+/5     Gastroc (S1/2) 4/5 2/5**  2+/5     EHL (L5) 4/5 2/5**  2+/5     ** indicates limited by AROM limitations                    Special Tests:  deferred  Neurological Screen:  Noted R side UE and LE with limited volitional control particularly in R foot/ ankle        Sensation: intact for light touch  Functional Mobility:         Gait/Ambulation:  Pt ambulating without A.D during assessment with noted foot drop (when observed without brace on R foot) Pt recently fitted for new soft brace by orthotist to help facilitate improved R foot alignment. Transfers:  Pt demonstrates sit to stand sit without use of hands from standard seat- supervised for increased effort and loading equally on BLE        Bed Mobility:  independent        Stairs:  Pt demonstrates reciprocal gait on stairs however not even wt bearing/ stance time. Balance:          Single Leg Stance:  R 2-3sec  L) 13 sec   Body Structures Involved:  1. Nerves  2. Joints  3. Muscles  4. Ligaments Body Functions Affected:  1. Neuromusculoskeletal  2. Movement Related Activities and Participation Affected:  1. General Tasks and Demands  2. Mobility  3. Self Care  4. Domestic Life  5. Community, Social and Civic Life   CLINICAL PRESENTATION:   CLINICAL DECISION MAKING:   Outcome Measure: Tool Used: Lower Extremity Functional Scale (LEFS)  Score:  Initial: 46/80 Most Recent: 50/80 (Date: 05/10/17 )   Interpretation of Score: 20 questions each scored on a 5 point scale with 0 representing \"extreme difficulty or unable to perform\" and 4 representing \"no difficulty\". The lower the score, the greater the functional disability. 80/80 represents no disability. Minimal detectable change is 9 points. Score 80 79-63 62-48 47-32 31-16 15-1 0   Modifier CH CI CJ CK CL CM CN     ? Mobility - Walking and Moving Around:     - CURRENT STATUS: CJ - 20%-39% impaired, limited or restricted    - GOAL STATUS: CJ - 20%-39% impaired, limited or restricted    - D/C STATUS:  ---------------To be determined---------------    Medical Necessity:   · Patient is expected to demonstrate progress in strength, range of motion and balance to increase independence with functional mobility safely.   Reason for Services/Other Comments:  · Patient continues to require modification of therapeutic interventions to increase complexity of exercises. TREATMENT:   (In addition to Assessment/Re-Assessment sessions the following treatments were rendered)    Therapeutic Exercise: (see flow sheet below for minutes):  Exercises per grid below to improve mobility, strength and balance. Required minimal visual and verbal cues to promote proper body alignment and promote proper body mechanics. Progressed resistance, range and repetitions as indicated. Aquatic Therapy (see flow sheet below for minutes): Aquatic treatment performed per flow grid for Decreased muscle strength, Decreased static/dynamic balance and reactive control and Ease of movement. Cues provided for technique. Assistance by therapist provided for progression of activities/ exercises. Patient has difficulty with R ankle/ balance. Date: 04/19/17 04/21/17 4/26/17 04/28/17 05/05/17 05/10/17 05/19/17     Modalities:                                                Manual Therapy: 15 min 15 min 20 min  15 min       STM to heel cord R heel cord To R heel cord/ peroneals (laterally) R heel cord/calf/peroneals 15         PROM PF/DF, INV/EV   5 min                     Aquatic Exercise:    45 min  60 min      Gait F/B/S/M    X4L each  x4L each F/B/S with dumb bells      Heel/Toe walk            4-way            PF/DF    x15  x20 BLE  x20 2 to 1 RLE      Hamstring Curls            Hip Flexion with knee ext.   (rockette)      x20 BLE      Squats      x15  x20      SLR march    x4LK  x4L      Lunges    X20 BLE  x4L      Hip circles            Hip horizontal abduction/adduction            Skipping (step hop)      x4L      Jogging       x20 slow christiano for coordination      Lateral skipping      x4L      Core:              Core:            Deep well bike      3 min      Deep well jumping georgie      2 min      Deep well scissors      2 min      Deep well:  traction            Deep well:  AROM Ankles      X15 each direction      Toe/ heel raises with Wonderboard    X15 R/L        Single leg marching with Wonderboard    X15 BLE        Hamstring Stretch    2H30 BLE  2H30 BLE      Heel cord stretch    2H30 BLE  2H30 BLE      Step Lunge            Piriformis stretch            PF Stretch            Balance: Single leg Stance            Balance: Tandem                                      Therapeutic Exercise: 10 min 40 min 40 min 15 min 45 min  55 min     Toe/ heel raises x15 BLE  x5 L/R with cues X15 BLE  X15 R/L singles x15 BLE ea  X15 BLE on floor/ AIREX  X15 BLE on flat/ Ariex     Heel cord stretch 2H20 BLE at bar  slant board 2H30  2H30 BLE on slant board       Eversion Seated with Red TB           TB (red) ankle ROM all directions  X15 RLE 4 way x15 red TB RLE  X15 R ankle with red TB  X15 in all directions red TB     Airex marching  x15 x15  X15 BLE  X15 BLE on Airex     Airex Heel to toe  x15 x15  X15 BLE       Tandem Stance  2H20   2H20 airex     2H30 BLE on both floor/ ARIEX     Squats       X20 on AIREX     Single leg stance  2H20  2h20 airex with support          Gait training  2x20 ft tandem, high knee march, [place] F/B/ alternate x75 ft even ground X15 min with heel lifts of varying density  X10 min with new brace to promote proper alignment  x500 ft on outdoor varied surfaces     Eccentric 2 to 1   RLE x15  X15 RLE  X15 RLE  X10 RLE on AIrex     LAQ   x15 BLE  2x15 BLE 3#  X20 BLE 3#      Seated march   x15 BLE  2x15 BLE  X20 BLE 3#     BOSU shoulder width stance       2H30 BLE     BOSU rocking side to side/ forward/ posterior       X15 R/L  X15 forward/ back                             F=forward  B=Backward  S=sidesteps  M=marches    H=hold    Manual: (see flow sheet above for minutes)     HEP: Addition of single leg heel/ toe raises BID as well as instructed in use of red TB for eversion exercises- based on previous therapy intervention pt has had these exercises in past however reports \"I have forgotten them\"    Treatment/Session Assessment:  Worked on today (see above for details). Focused on ankle strength/ proprioception. Pt cont to struggles with volitional control of RLE- particularly with quick change in direction movements as well as forming and maintaining flat foot placement rather than supination. Plan to continue briefly with primary use of land (PRN use of pool)  Pt to follow up with orthotist next week with possible AFO per pt report. · Pain/ Symptoms: Initial: 0/10  Pt reports no pain anywhere but in her mouth due to dental work last week. Post Session:  0/10 ·   Compliance with Program/Exercises: Will assess as treatment progresses. Recommendations/Intent for next treatment session: \"Next visit will focus on advancements to more challenging activities\". Plan to continue work on land.     Total Treatment Duration:  PT Patient Time In/Time Out  Time In: 1130  Time Out: [address], PT

## 2017-05-22 ENCOUNTER — HOSPITAL ENCOUNTER (OUTPATIENT)
Dept: PHYSICAL THERAPY | Age: 62
Discharge: HOME | End: 2017-05-22
Payer: MEDICARE

## 2017-05-22 PROCEDURE — 97110 THERAPEUTIC EXERCISES: CPT

## 2017-05-22 NOTE — PROGRESS NOTES
Maxwell Michele  : [id number] [facility],[address] @ [address]  [address] [place] [address].  [phone]   [phone]        OUTPATIENT PHYSICAL THERAPY:Daily Note 2017      ICD-10: Treatment Diagnosis:  Difficulty in walking, not elsewhere classified (R26.2)  Foot drop, right foot (M21.371)  Precautions/Allergies:   Zanaflex [tizanidine]   Fall Risk Score: 1 (? 5 = High Risk)  MD Orders: Evaluate and Treat MEDICAL/REFERRING DIAGNOSIS:  Encounter for other specified aftercare [Z51.89]  Cerebral aneurysm [I67.1]   DATE OF ONSET: chronic  REFERRING PHYSICIAN: Terence Cao MD  RETURN PHYSICIAN APPOINTMENT: TBA     INITIAL ASSESSMENT:  Ms. Juma Mosqueda (pt) is a 63year old white female seen for physical therapy per MD orders with complaint of decreased balance, decreased strength R foot and antalgic gait. .  Pt evaluated for outpatient physical therapy today with the following deficits: decreased strength/ ROM R foot, decreased balance and antalgic gait. Pt would benefit from physical therapy to address the above deficits in order to return to increased independence with functional mobility safely with improved balance. Pt would benefit from working in aquatic setting to address goals. Also, plan to transition to gravity challenging, land based exercises/ activities. Plan of care and goals reviewed with patient who verbalizes agreement. PROBLEM LIST (Impacting functional limitations):  1. Decreased Strength  2. Decreased Ambulation Ability/Technique  3. Decreased Balance  4. Decreased Flexibility/Joint Mobility INTERVENTIONS PLANNED:  1. Balance Exercise  2. Gait Training  3. Home Exercise Program (HEP)  4. Manual Therapy  5. Neuromuscular Re-education/Strengthening  6. Range of Motion (ROM)  7. Therapeutic Activites  8. Therapeutic Exercise/Strengthening  9. modalities   10. Aquatics   TREATMENT PLAN:  Effective Dates: 17 TO 17.   Frequency/Duration:1-2 times a week for 12 weeks. GOALS: (Goals have been discussed and agreed upon with patient.)  Short-Term Functional Goals: Time Frame: 2 weeks  Patient will demonstrate independence and compliance with home exercise program for management of symptoms. (met)  Pt will demonstrate increase in Lower Extremity Functional Scale by 2-3 points for improved functional mobility. (met)  Pt will tolerate 35 to 40 minutes of aquatic therapy to address strength and balance right foot. (met)      Discharge Goals: Time Frame: 8 weeks  Pt will report Lower Extremity Functional Scale score of 51/80 for improved function. (in progress)  Pt will demonstrate improved active range of motion of right ankle dorsiflexion by 5 ° for normalized heel strike in gait sequence. (in progress)  Pt will ambulate independently  >/= 1000 feet with improved gait pattern- equal stance time and R LE heel strike at initial stance phase. (in progress)  Pt will demonstrate improved single leg stance on RLE by 4 to 6 secs for improved safety and falls prevention.  (in progress)  Pt will demonstrate reciprocal gait up/ down 10-12 steps with independently with even stance time with use of unilateral handrail. (in progress)    Rehabilitation Potential For Stated Goals: Good                HISTORY:   History of Present Injury/Illness (Reason for Referral):  Pt is 64 y/o WF seen for PT per MD orders following brain aneurysm during surgical procedure in 2014 with significant residual R side paralysis/ limitations. PT had been seen for physical therapy and is familiar to this therapist for extensive rehab addressing foot drop/ gait in spring/ summer 2016. Within a couple of weeks of therapy D/C in 2016 pt fell while taking dog out to bathroom, fracturing first three toes on R foot. Since her initial cerebral event in 2014 pt has been wearing some semblance of brace to help reduce foot drop and reports she is being evaluated for orthotic/ brace again in May.   Pt reports her goals include being able to walk \"great\" to run, to dance and balance. Past Medical History/Comorbidities:   Ms. Jayleen Vang  has a past medical history of Anxiety disorder; Back pain; Brain aneurysm; Cerebral hemorrhage ([facility] [address] History of drug dependence ([facility] [address] History of vitamin D deficiency; Hypercholesterolemia; Joint pain; Personal history of colonic polyps; Stroke [facility]); and Unspecified late effects of cerebrovascular disease. She also has no past medical history of Adverse effect of anesthesia; Difficult intubation; Malignant hyperthermia due to anesthesia; Nausea & vomiting; or Pseudocholinesterase deficiency. Ms. Jayleen Vang  has a past surgical history that includes suzette and bso (1990); intracranial aneurysm repair (2014); colonoscopy; and knee arthroscopy. Social History/Living Environment:     Pt lives with  in 2 story home. Prior Level of Function/Work/Activity:  Previous history of therapy in spring/ summer 2016. Walking with significant foot drop (without ankle brace intact) without use of assistive device except in unfamiliar surroundings  Dominant Side:         LEFT  Current Medications:    Current Outpatient Prescriptions:     HYDROcodone-acetaminophen (NORCO) 7.5-325 mg per tablet, Take 1 Tab by mouth two (2) times a day. Max Daily Amount: 2 Tabs., Disp: 60 Tab, Rfl: 0    HYDROcodone-acetaminophen (NORCO) 7.5-325 mg per tablet, Take 1 Tab by mouth two (2) times a day. Max Daily Amount: 2 Tabs., Disp: 60 Tab, Rfl: 0    [START ON 6/4/2017] HYDROcodone-acetaminophen (NORCO) 7.5-325 mg per tablet, Take 1 Tab by mouth two (2) times a day. Max Daily Amount: 2 Tabs., Disp: 60 Tab, Rfl: 0    atorvastatin (LIPITOR) 20 mg tablet, Take 1 Tab by mouth daily. Indications: hypercholesterolemia, Disp: 90 Tab, Rfl: 3    alendronate (FOSAMAX) 70 mg tablet, Take 1 Tab by mouth every seven (7) days.  Indications: POST-MENOPAUSAL OSTEOPOROSIS, Disp: 12 Tab, Rfl: 3    gabapentin (NEURONTIN) 400 mg capsule, Take 1 Cap by mouth three (3) times daily. , Disp: 270 Cap, Rfl: 3    buPROPion XL (WELLBUTRIN XL) 150 mg tablet, Take 1 Tab by mouth every morning., Disp: 90 Tab, Rfl: 0    cholecalciferol (VITAMIN D3) 1,000 unit cap, Take 1 Cap by mouth nightly. Indications: VITAMIN D DEFICIENCY, Disp: , Rfl:     diclofenac (VOLTAREN) 1 % gel, Apply topically 4 (four) times a day. Apply 2 grams to painful areas up to four times daily, Disp: , Rfl:     Multivit-Iron-Min-Folic Acid (CENTRUM COMPLETE) 18-0.4 mg tab, Take  by mouth.  Stop seven days prior to surgery per anesthesia protocol., Disp: , Rfl:    Date Last Reviewed:  5/22/2017   EXAMINATION:   Observation/Orthostatic Postural Assessment:          Pt of petite stature  Palpation:          No tenderness noted in R foot/ ankle however tightness \"roping\" and tenderness in lateral R calf  ROM:  BUE/ cervical WFL                     Date:  04/19/17 Date:  05/10/17 Date:     Trunk ROM WFL                          LE ROM Left Right       Hip Flexion [facility]       Hip Abduction [facility]       Straight Leg Raise (SLR) 90 ° 90 °                Knee Flexion [facility]       Knee Extension [facility] WFL                Ankle Inversion 35 ° 35 ° 35 35     Ankle Eversion 20 ° 7 ° 20 7     Ankle Planar Flexion 50 ° 40 ° 50 40     Ankle Dorsiflexion 20 ° 5 °** 20 5     ** compensated with eversion as weil  Strength:     Date:  04/19/17 Date:  05/10/17 Date:     LE MMT Left Right Left Right Left Right   Hip Flex (L1/L2) 4/5 4/5       Hip Abd (glut med) 4/5 4/5       Hip Ext 4/5 4/5       Quad    ( L3/4) 4/5 4/5       Hamstring 4/5 4/5       Anterior Tib (L4/L5) 4/5 2/5**  2+/5     Gastroc (S1/2) 4/5 2/5**  2+/5     EHL (L5) 4/5 2/5**  2+/5     ** indicates limited by AROM limitations                    Special Tests:  deferred  Neurological Screen:  Noted R side UE and LE with limited volitional control particularly in R foot/ ankle        Sensation: intact for light touch  Functional Mobility:         Gait/Ambulation:  Pt ambulating without A.D during assessment with noted foot drop (when observed without brace on R foot) Pt recently fitted for new soft brace by orthotist to help facilitate improved R foot alignment. Transfers:  Pt demonstrates sit to stand sit without use of hands from standard seat- supervised for increased effort and loading equally on BLE        Bed Mobility:  independent        Stairs:  Pt demonstrates reciprocal gait on stairs however not even wt bearing/ stance time. Balance:          Single Leg Stance:  R 2-3sec  L) 13 sec   Body Structures Involved:  1. Nerves  2. Joints  3. Muscles  4. Ligaments Body Functions Affected:  1. Neuromusculoskeletal  2. Movement Related Activities and Participation Affected:  1. General Tasks and Demands  2. Mobility  3. Self Care  4. Domestic Life  5. Community, Social and Civic Life   CLINICAL PRESENTATION:   CLINICAL DECISION MAKING:   Outcome Measure: Tool Used: Lower Extremity Functional Scale (LEFS)  Score:  Initial: 46/80 Most Recent: 50/80 (Date: 05/10/17 )   Interpretation of Score: 20 questions each scored on a 5 point scale with 0 representing \"extreme difficulty or unable to perform\" and 4 representing \"no difficulty\". The lower the score, the greater the functional disability. 80/80 represents no disability. Minimal detectable change is 9 points. Score 80 79-63 62-48 47-32 31-16 15-1 0   Modifier CH CI CJ CK CL CM CN     ? Mobility - Walking and Moving Around:     - CURRENT STATUS: CJ - 20%-39% impaired, limited or restricted    - GOAL STATUS: CJ - 20%-39% impaired, limited or restricted    - D/C STATUS:  ---------------To be determined---------------    Medical Necessity:   · Patient is expected to demonstrate progress in strength, range of motion and balance to increase independence with functional mobility safely.   Reason for Services/Other Comments:  · Patient continues to require modification of therapeutic interventions to increase complexity of exercises. TREATMENT:   (In addition to Assessment/Re-Assessment sessions the following treatments were rendered)    Therapeutic Exercise: (see flow sheet below for minutes):  Exercises per grid below to improve mobility, strength and balance. Required minimal visual and verbal cues to promote proper body alignment and promote proper body mechanics. Progressed resistance, range and repetitions as indicated. Aquatic Therapy (see flow sheet below for minutes): Aquatic treatment performed per flow grid for Decreased muscle strength, Decreased static/dynamic balance and reactive control and Ease of movement. Cues provided for technique. Assistance by therapist provided for progression of activities/ exercises. Patient has difficulty with R ankle/ balance. Date: 04/19/17 04/21/17 4/26/17 04/28/17 05/05/17 05/10/17 05/19/17 05/22/17    Modalities:                                                Manual Therapy: 15 min 15 min 20 min  15 min       STM to heel cord R heel cord To R heel cord/ peroneals (laterally) R heel cord/calf/peroneals 15         PROM PF/DF, INV/EV   5 min                     Aquatic Exercise:    45 min  60 min      Gait F/B/S/M    X4L each  x4L each F/B/S with dumb bells      Heel/Toe walk            4-way            PF/DF    x15  x20 BLE  x20 2 to 1 RLE      Hamstring Curls            Hip Flexion with knee ext.   (rockette)      x20 BLE      Squats      x15  x20      SLR march    x4LK  x4L      Lunges    X20 BLE  x4L      Hip circles            Hip horizontal abduction/adduction            Skipping (step hop)      x4L      Jogging       x20 slow christiano for coordination      Lateral skipping      x4L      Core:              Core:            Deep well bike      3 min      Deep well jumping georgie      2 min      Deep well scissors      2 min      Deep well:  traction            Deep well:  AROM Ankles      X15 each direction      Toe/ heel raises with Wonderboard    X15 R/L        Single leg marching with Wonderboard    X15 BLE        Hamstring Stretch    2H30 BLE  2H30 BLE      Heel cord stretch    2H30 BLE  2H30 BLE      Step Lunge            Piriformis stretch            PF Stretch            Balance: Single leg Stance            Balance: Tandem                                      Therapeutic Exercise: 10 min 40 min 40 min 15 min 45 min  55 min 60 min    Toe/ heel raises x15 BLE  x5 L/R with cues X15 BLE  X15 R/L singles x15 BLE ea  X15 BLE on floor/ AIREX  X15 BLE on flat/ Ariex x15 BLE     Heel cord stretch 2H20 BLE at bar  slant board 2H30  2H30 BLE on slant board   2H30 BLE    Eversion Seated with Red TB           TB (red) ankle ROM all directions  X15 RLE 4 way x15 red TB RLE  X15 R ankle with red TB  X15 in all directions red TB x20 in all directions with red TB    Airex marching  x15 x15  X15 BLE  X15 BLE on Airex x15 BLE on airex    Airex Heel to toe  x15 x15  X15 BLE   x15 LBE each in sit/ stand    Tandem Stance  2H20   2H20 airex     2H30 BLE on both floor/ ARIEX 2H30 flat/ Airex    Squats       X20 on AIREX x15 on Airex    Lunges        x10 BLE    Single leg stance  2H20  2h20 airex with support      2H30 BLE  On flat/ airex    Gait training  2x20 ft tandem, high knee march, [place] F/B/ alternate x75 ft even ground X15 min with heel lifts of varying density  X10 min with new brace to promote proper alignment  x500 ft on outdoor varied surfaces 10 minutes over various inclines/ surfaces    Eccentric 2 to 1   RLE x15  X15 RLE  X15 RLE  X10 RLE on AIrex same    LAQ   x15 BLE  2x15 BLE 3#  X20 BLE 3#  x20 BLE 4#    Seated march   x15 BLE  2x15 BLE  X20 BLE 3# x20 BLE 4#    BOSU shoulder width stance       2H30 BLE     BOSU rocking side to side/ forward/ posterior       X15 R/L  X15 forward/ back     Rocker Board        x15 in all directions                F=forward  B=Backward  S=sidesteps  M=marches    H=hold    Manual: (see flow sheet above for minutes)     HEP: Addition of single leg heel/ toe raises BID as well as instructed in use of red TB for eversion exercises- based on previous therapy intervention pt has had these exercises in past however reports \"I have forgotten them\"    Treatment/Session Assessment:  Worked on land  today (see above for details) with focus on ankle strength/ proprioception challenging on various surfaces. Pt cont to struggles with volitional control of RLE- particularly with quick change in direction movements as well as forming and maintaining flat foot placement rather than supination. Plan to continue briefly with primary use of land following up at next visit post pt receiving new AFO with possible D/C . · Pain/ Symptoms: Initial: 0/10  Pt reports she hasn't been sleeping well due to pain in jaw. Post Session:  0/10 ·   Compliance with Program/Exercises: Will assess as treatment progresses. Recommendations/Intent for next treatment session: \"Next visit will focus on advancements to more challenging activities\". Plan to continue work on land.     Total Treatment Duration:  PT Patient Time In/Time Out  Time In: 1130  Time Out: [address] 68, PT

## 2017-05-24 ENCOUNTER — HOSPITAL ENCOUNTER (OUTPATIENT)
Dept: PHYSICAL THERAPY | Age: 62
Discharge: HOME | End: 2017-05-24
Payer: MEDICARE

## 2017-05-24 NOTE — PROGRESS NOTES
Pt cancelled physical therapy today due to dental appt. Will plan to resume PT at next scheduled visit to address goals/ POC.

## 2017-05-31 ENCOUNTER — HOSPITAL ENCOUNTER (OUTPATIENT)
Dept: PHYSICAL THERAPY | Age: 62
Discharge: HOME | End: 2017-05-31
Payer: MEDICARE

## 2017-05-31 PROCEDURE — 97110 THERAPEUTIC EXERCISES: CPT

## 2017-05-31 NOTE — PROGRESS NOTES
Kia Jena  : 1955 [address] [facility] @ [address], [address] [place] [address]  [phone]   [phone]        OUTPATIENT PHYSICAL THERAPY:Daily Note 2017      ICD-10: Treatment Diagnosis:  Difficulty in walking, not elsewhere classified (R26.2)  Foot drop, right foot (M21.371)  Precautions/Allergies:   Zanaflex [tizanidine]   Fall Risk Score: 1 (? 5 = High Risk)  MD Orders: Evaluate and Treat MEDICAL/REFERRING DIAGNOSIS:  Encounter for other specified aftercare [Z51.89]  Cerebral aneurysm [I67.1]   DATE OF ONSET: chronic  REFERRING PHYSICIAN: Brian Atkins MD  RETURN PHYSICIAN APPOINTMENT: TBA     INITIAL ASSESSMENT:  Ms. Cyndie Haque (pt) is a 63year old white female seen for physical therapy per MD orders with complaint of decreased balance, decreased strength R foot and antalgic gait. .  Pt evaluated for outpatient physical therapy today with the following deficits: decreased strength/ ROM R foot, decreased balance and antalgic gait. Pt would benefit from physical therapy to address the above deficits in order to return to increased independence with functional mobility safely with improved balance. Pt would benefit from working in aquatic setting to address goals. Also, plan to transition to gravity challenging, land based exercises/ activities. Plan of care and goals reviewed with patient who verbalizes agreement. PROBLEM LIST (Impacting functional limitations):  1. Decreased Strength  2. Decreased Ambulation Ability/Technique  3. Decreased Balance  4. Decreased Flexibility/Joint Mobility INTERVENTIONS PLANNED:  1. Balance Exercise  2. Gait Training  3. Home Exercise Program (HEP)  4. Manual Therapy  5. Neuromuscular Re-education/Strengthening  6. Range of Motion (ROM)  7. Therapeutic Activites  8. Therapeutic Exercise/Strengthening  9. modalities   10. Aquatics   TREATMENT PLAN:  Effective Dates: 17 TO 17.   Frequency/Duration:1-2 times a week for 12 weeks. GOALS: (Goals have been discussed and agreed upon with patient.)  Short-Term Functional Goals: Time Frame: 2 weeks  Patient will demonstrate independence and compliance with home exercise program for management of symptoms. (met)  Pt will demonstrate increase in Lower Extremity Functional Scale by 2-3 points for improved functional mobility. (met)  Pt will tolerate 35 to 40 minutes of aquatic therapy to address strength and balance right foot. (met)      Discharge Goals: Time Frame: 8 weeks  Pt will report Lower Extremity Functional Scale score of 51/80 for improved function. (in progress)  Pt will demonstrate improved active range of motion of right ankle dorsiflexion by 5 ° for normalized heel strike in gait sequence. (in progress)  Pt will ambulate independently  >/= 1000 feet with improved gait pattern- equal stance time and R LE heel strike at initial stance phase. (in progress)  Pt will demonstrate improved single leg stance on RLE by 4 to 6 secs for improved safety and falls prevention.  (in progress)  Pt will demonstrate reciprocal gait up/ down 10-12 steps with independently with even stance time with use of unilateral handrail. (in progress)    Rehabilitation Potential For Stated Goals: Good                HISTORY:   History of Present Injury/Illness (Reason for Referral):  Pt is 64 y/o WF seen for PT per MD orders following brain aneurysm during surgical procedure in 2014 with significant residual R side paralysis/ limitations. PT had been seen for physical therapy and is familiar to this therapist for extensive rehab addressing foot drop/ gait in spring/ summer 2016. Within a couple of weeks of therapy D/C in 2016 pt fell while taking dog out to bathroom, fracturing first three toes on R foot. Since her initial cerebral event in 2014 pt has been wearing some semblance of brace to help reduce foot drop and reports she is being evaluated for orthotic/ brace again in May.   Pt reports her goals include being able to walk \"great\" to run, to dance and balance. Past Medical History/Comorbidities:   Ms. Duane Porter  has a past medical history of Anxiety disorder; Back pain; Brain aneurysm; Cerebral hemorrhage ([facility] [address] History of drug dependence ([facility] [address] History of vitamin D deficiency; Hypercholesterolemia; Joint pain; Personal history of colonic polyps; Stroke [facility]); and Unspecified late effects of cerebrovascular disease. She also has no past medical history of Adverse effect of anesthesia; Difficult intubation; Malignant hyperthermia due to anesthesia; Nausea & vomiting; or Pseudocholinesterase deficiency. Ms. Duane Porter  has a past surgical history that includes suzette and bso (1990); intracranial aneurysm repair (2014); colonoscopy; and knee arthroscopy. Social History/Living Environment:     Pt lives with  in 2 story home. Prior Level of Function/Work/Activity:  Previous history of therapy in spring/ summer 2016. Walking with significant foot drop (without ankle brace intact) without use of assistive device except in unfamiliar surroundings  Dominant Side:         LEFT  Current Medications:    Current Outpatient Prescriptions:     HYDROcodone-acetaminophen (NORCO) 7.5-325 mg per tablet, Take 1 Tab by mouth two (2) times a day. Max Daily Amount: 2 Tabs., Disp: 60 Tab, Rfl: 0    HYDROcodone-acetaminophen (NORCO) 7.5-325 mg per tablet, Take 1 Tab by mouth two (2) times a day. Max Daily Amount: 2 Tabs., Disp: 60 Tab, Rfl: 0    [START ON 6/4/2017] HYDROcodone-acetaminophen (NORCO) 7.5-325 mg per tablet, Take 1 Tab by mouth two (2) times a day. Max Daily Amount: 2 Tabs., Disp: 60 Tab, Rfl: 0    atorvastatin (LIPITOR) 20 mg tablet, Take 1 Tab by mouth daily. Indications: hypercholesterolemia, Disp: 90 Tab, Rfl: 3    alendronate (FOSAMAX) 70 mg tablet, Take 1 Tab by mouth every seven (7) days.  Indications: POST-MENOPAUSAL OSTEOPOROSIS, Disp: 12 Tab, Rfl: 3    gabapentin (NEURONTIN) 400 mg capsule, Take 1 Cap by mouth three (3) times daily. , Disp: 270 Cap, Rfl: 3    buPROPion XL (WELLBUTRIN XL) 150 mg tablet, Take 1 Tab by mouth every morning., Disp: 90 Tab, Rfl: 0    cholecalciferol (VITAMIN D3) 1,000 unit cap, Take 1 Cap by mouth nightly. Indications: VITAMIN D DEFICIENCY, Disp: , Rfl:     diclofenac (VOLTAREN) 1 % gel, Apply topically 4 (four) times a day. Apply 2 grams to painful areas up to four times daily, Disp: , Rfl:     Multivit-Iron-Min-Folic Acid (CENTRUM COMPLETE) 18-0.4 mg tab, Take  by mouth.  Stop seven days prior to surgery per anesthesia protocol., Disp: , Rfl:    Date Last Reviewed:  5/31/2017   EXAMINATION:   Observation/Orthostatic Postural Assessment:          Pt of petite stature  Palpation:          No tenderness noted in R foot/ ankle however tightness \"roping\" and tenderness in lateral R calf  ROM:  BUE/ cervical WFL                     Date:  04/19/17 Date:  05/10/17 Date:     Trunk ROM WFL                          LE ROM Left Right       Hip Flexion [facility]       Hip Abduction [facility]       Straight Leg Raise (SLR) 90 ° 90 °                Knee Flexion [facility]       Knee Extension [facility] WFL                Ankle Inversion 35 ° 35 ° 35 35     Ankle Eversion 20 ° 7 ° 20 7     Ankle Planar Flexion 50 ° 40 ° 50 40     Ankle Dorsiflexion 20 ° 5 °** 20 5     ** compensated with eversion as weil  Strength:     Date:  04/19/17 Date:  05/10/17 Date:     LE MMT Left Right Left Right Left Right   Hip Flex (L1/L2) 4/5 4/5       Hip Abd (glut med) 4/5 4/5       Hip Ext 4/5 4/5       Quad    ( L3/4) 4/5 4/5       Hamstring 4/5 4/5       Anterior Tib (L4/L5) 4/5 2/5**  2+/5     Gastroc (S1/2) 4/5 2/5**  2+/5     EHL (L5) 4/5 2/5**  2+/5     ** indicates limited by AROM limitations                    Special Tests:  deferred  Neurological Screen:  Noted R side UE and LE with limited volitional control particularly in R foot/ ankle        Sensation: intact for light touch  Functional Mobility:         Gait/Ambulation:  Pt ambulating without A.D during assessment with noted foot drop (when observed without brace on R foot) Pt recently fitted for new soft brace by orthotist to help facilitate improved R foot alignment. Transfers:  Pt demonstrates sit to stand sit without use of hands from standard seat- supervised for increased effort and loading equally on BLE        Bed Mobility:  independent        Stairs:  Pt demonstrates reciprocal gait on stairs however not even wt bearing/ stance time. Balance:          Single Leg Stance:  R 2-3sec  L) 13 sec   Body Structures Involved:  1. Nerves  2. Joints  3. Muscles  4. Ligaments Body Functions Affected:  1. Neuromusculoskeletal  2. Movement Related Activities and Participation Affected:  1. General Tasks and Demands  2. Mobility  3. Self Care  4. Domestic Life  5. Community, Social and Civic Life   CLINICAL PRESENTATION:   CLINICAL DECISION MAKING:   Outcome Measure: Tool Used: Lower Extremity Functional Scale (LEFS)  Score:  Initial: 46/80 Most Recent: 50/80 (Date: 05/10/17 )   Interpretation of Score: 20 questions each scored on a 5 point scale with 0 representing \"extreme difficulty or unable to perform\" and 4 representing \"no difficulty\". The lower the score, the greater the functional disability. 80/80 represents no disability. Minimal detectable change is 9 points. Score 80 79-63 62-48 47-32 31-16 15-1 0   Modifier CH CI CJ CK CL CM CN     ? Mobility - Walking and Moving Around:     - CURRENT STATUS: CJ - 20%-39% impaired, limited or restricted    - GOAL STATUS: CJ - 20%-39% impaired, limited or restricted    - D/C STATUS:  ---------------To be determined---------------    Medical Necessity:   · Patient is expected to demonstrate progress in strength, range of motion and balance to increase independence with functional mobility safely.   Reason for Services/Other Comments:  · Patient continues to require modification of therapeutic interventions to increase complexity of exercises. TREATMENT:   (In addition to Assessment/Re-Assessment sessions the following treatments were rendered)    Therapeutic Exercise: (see flow sheet below for minutes):  Exercises per grid below to improve mobility, strength and balance. Required minimal visual and verbal cues to promote proper body alignment and promote proper body mechanics. Progressed resistance, range and repetitions as indicated. Aquatic Therapy (see flow sheet below for minutes): Aquatic treatment performed per flow grid for Decreased muscle strength, Decreased static/dynamic balance and reactive control and Ease of movement. Cues provided for technique. Assistance by therapist provided for progression of activities/ exercises. Patient has difficulty with R ankle/ balance. Date: 04/19/17 04/21/17 4/26/17 04/28/17 05/05/17 05/10/17 05/19/17 05/22/17 05/31/17   Modalities:                                                Manual Therapy: 15 min 15 min 20 min  15 min       STM to heel cord R heel cord To R heel cord/ peroneals (laterally) R heel cord/calf/peroneals 15         PROM PF/DF, INV/EV   5 min                     Aquatic Exercise:    45 min  60 min      Gait F/B/S/M    X4L each  x4L each F/B/S with dumb bells      Heel/Toe walk            4-way            PF/DF    x15  x20 BLE  x20 2 to 1 RLE      Hamstring Curls            Hip Flexion with knee ext.   (rockette)      x20 BLE      Squats      x15  x20      SLR march    x4LK  x4L      Lunges    X20 BLE  x4L      Hip circles            Hip horizontal abduction/adduction            Skipping (step hop)      x4L      Jogging       x20 slow christiano for coordination      Lateral skipping      x4L      Core:              Core:            Deep well bike      3 min      Deep well jumping georgie      2 min      Deep well scissors      2 min      Deep well:  traction            Deep well:  AROM Ankles      X15 each direction Toe/ heel raises with Wonderboard    X15 R/L        Single leg marching with Wonderboard    X15 BLE        Hamstring Stretch    2H30 BLE  2H30 BLE      Heel cord stretch    2H30 BLE  2H30 BLE      Step Lunge            Piriformis stretch            PF Stretch            Balance: Single leg Stance            Balance: Tandem                                      Therapeutic Exercise: 10 min 40 min 40 min 15 min 45 min  55 min 60 min 55 min   Toe/ heel raises x15 BLE  x5 L/R with cues X15 BLE  X15 R/L singles x15 BLE ea  X15 BLE on floor/ AIREX  X15 BLE on flat/ Ariex x15 BLE     Heel cord stretch 2H20 BLE at bar  slant board 2H30  2H30 BLE on slant board   2H30 BLE    Eversion Seated with Red TB           TB (red) ankle ROM all directions  X15 RLE 4 way x15 red TB RLE  X15 R ankle with red TB  X15 in all directions red TB x20 in all directions with red TB X20 in all direction red TB   Airex marching  x15 x15  X15 BLE  X15 BLE on Airex x15 BLE on airex X15 BLE AIREX   Airex Heel to toe  x15 x15  X15 BLE   x15 LBE each in sit/ stand same   Tandem Stance  2H20   2H20 airex     2H30 BLE on both floor/ ARIEX 2H30 flat/ Airex same   Squats       X20 on AIREX x15 on Airex same   Lunges        x10 BLE    Single leg stance  2H20  2h20 airex with support      2H30 BLE  On flat/ airex    Gait training  2x20 ft tandem, high knee march, [place] F/B/ alternate x75 ft even ground X15 min with heel lifts of varying density  X10 min with new brace to promote proper alignment  x500 ft on outdoor varied surfaces 10 minutes over various inclines/ surfaces    Eccentric 2 to 1   RLE x15  X15 RLE  X15 RLE  X10 RLE on AIrex same    LAQ   x15 BLE  2x15 BLE 3#  X20 BLE 3#  x20 BLE 4#    Seated march   x15 BLE  2x15 BLE  X20 BLE 3# x20 BLE 4#    BOSU shoulder width stance       2H30 BLE     BOSU rocking side to side/ forward/ posterior       X15 R/L  X15 forward/ back     Rocker Board        x15 in all directions    SLR        X15 BLE    SLR with ER        X15 BLE    TKE        x15H5 sec    Gait training with AFO        10 min                F=forward  B=Backward  S=sidesteps  M=marches    H=hold    Manual: (see flow sheet above for minutes)     HEP: Addition of single leg heel/ toe raises BID as well as instructed in use of red TB for eversion exercises- based on previous therapy intervention pt has had these exercises in past however reports \"I have forgotten them\"    Treatment/Session Assessment:  Worked on land  today (see above for details) with focus on ankle strength/ proprioception as well as gait training with new AFO- pt does get improved heel to toe however pt fearful of falling due to new \"feeling\". Pt to continue to work wearing AFO 2 hors BID and is to be fitting for NMES for her anterior tib on June 8. Will follow up after that appt with orthotist to monitor progress. PT given HEP (SLR, SLR with ER, TKE)  Pt cont to struggles with volitional control of RLE- particularly with quick change in direction movements as well as forming and maintaining flat foot placement rather than supination. .          · Pain/ Symptoms: Initial: 0/10   Post Session:  0/10 ·   Compliance with Program/Exercises: Will assess as treatment progresses. Recommendations/Intent for next treatment session: \"Next visit will focus on advancements to more challenging activities\". Plan to continue work on land.     Total Treatment Duration:        PT Patient Time In/Time Out  Time In: 1130  Time Out: Irma [address], PT

## 2017-06-22 ENCOUNTER — HOSPITAL ENCOUNTER (OUTPATIENT)
Dept: PHYSICAL THERAPY | Age: 62
Discharge: HOME | End: 2017-06-22

## 2017-06-22 NOTE — PROGRESS NOTES
Pt cancelled appointment for today and rescheduled for tomorrow. Plan to continue with current POC next visit.

## 2017-06-23 ENCOUNTER — HOSPITAL ENCOUNTER (OUTPATIENT)
Dept: PHYSICAL THERAPY | Age: 62
Discharge: HOME | End: 2017-06-23

## 2017-06-23 NOTE — PROGRESS NOTES
Pt cancelled physical therapy today due to family illness. Will plan to resume PT at next scheduled visit to address goals/ POC.

## 2017-06-23 NOTE — PROGRESS NOTES
Zak Whitley  : 1955 [address]  [phone]   [id number]        OUTPATIENT PHYSICAL THERAPY:Daily Note and Discontinuation Summary 2017      ICD-10: Treatment Diagnosis:  Difficulty in walking, not elsewhere classified (R26.2)  Foot drop, right foot (M21.371)  Precautions/Allergies:   Zanaflex [tizanidine]   Fall Risk Score: 1 (? 5 = High Risk)  MD Orders: Evaluate and Treat MEDICAL/REFERRING DIAGNOSIS:  Encounter for other specified aftercare [Z51.89]  Cerebral aneurysm [I67.1]   DATE OF ONSET: chronic  REFERRING PHYSICIAN: Alison Hankins MD  RETURN PHYSICIAN APPOINTMENT: TBA     INITIAL ASSESSMENT:  Ms. Katie Chavez (pt) is a 63year old white female seen for physical therapy per MD orders with complaint of decreased balance, decreased strength R foot and antalgic gait. .  Pt evaluated for outpatient physical therapy today with the following deficits: decreased strength/ ROM R foot, decreased balance and antalgic gait. Pt would benefit from physical therapy to address the above deficits in order to return to increased independence with functional mobility safely with improved balance. Pt would benefit from working in aquatic setting to address goals. Also, plan to transition to gravity challenging, land based exercises/ activities. Plan of care and goals reviewed with patient who verbalizes agreement. PROBLEM LIST (Impacting functional limitations):  1. Decreased Strength  2. Decreased Ambulation Ability/Technique  3. Decreased Balance  4. Decreased Flexibility/Joint Mobility INTERVENTIONS PLANNED:  1. Balance Exercise  2. Gait Training  3. Home Exercise Program (HEP)  4. Manual Therapy  5. Neuromuscular Re-education/Strengthening  6. Range of Motion (ROM)  7. Therapeutic Activites  8. Therapeutic Exercise/Strengthening  9. modalities   10.  Aquatics   TREATMENT PLAN:  Effective Dates: 17 TO 07/12/17. Frequency/Duration:1-2 times a week for 12 weeks. GOALS: (Goals have been discussed and agreed upon with patient.)  Short-Term Functional Goals: Time Frame: 2 weeks  Patient will demonstrate independence and compliance with home exercise program for management of symptoms. (met)  Pt will demonstrate increase in Lower Extremity Functional Scale by 2-3 points for improved functional mobility. (met)  Pt will tolerate 35 to 40 minutes of aquatic therapy to address strength and balance right foot. (met)      Discharge Goals: Time Frame: 8 weeks  Pt will report Lower Extremity Functional Scale score of 51/80 for improved function. (in progress)  Pt will demonstrate improved active range of motion of right ankle dorsiflexion by 5 ° for normalized heel strike in gait sequence. (in progress)  Pt will ambulate independently  >/= 1000 feet with improved gait pattern- equal stance time and R LE heel strike at initial stance phase. (in progress)  Pt will demonstrate improved single leg stance on RLE by 4 to 6 secs for improved safety and falls prevention.  (in progress)  Pt will demonstrate reciprocal gait up/ down 10-12 steps with independently with even stance time with use of unilateral handrail. (in progress)    Rehabilitation Potential For Stated Goals: Good                HISTORY:   History of Present Injury/Illness (Reason for Referral):  Pt is 62 y/o WF seen for PT per MD orders following brain aneurysm during surgical procedure in 2014 with significant residual R side paralysis/ limitations. PT had been seen for physical therapy and is familiar to this therapist for extensive rehab addressing foot drop/ gait in spring/ summer 2016. Within a couple of weeks of therapy D/C in 2016 pt fell while taking dog out to bathroom, fracturing first three toes on R foot.   Since her initial cerebral event in 2014 pt has been wearing some semblance of brace to help reduce foot drop and reports she is being evaluated for orthotic/ brace again in May. Pt reports her goals include being able to walk \"great\" to run, to dance and balance. Past Medical History/Comorbidities:   Ms. Analilia Moran  has a past medical history of Anxiety disorder; Back pain; Brain aneurysm; Cerebral hemorrhage ([facility] [address] History of drug dependence ([facility] [address] History of vitamin D deficiency; Hypercholesterolemia; Joint pain; Personal history of colonic polyps; Stroke [facility]); and Unspecified late effects of cerebrovascular disease. She also has no past medical history of Adverse effect of anesthesia; Difficult intubation; Malignant hyperthermia due to anesthesia; Nausea & vomiting; or Pseudocholinesterase deficiency. Ms. Analilia Moran  has a past surgical history that includes suzette and bso (1990); intracranial aneurysm repair (2014); colonoscopy; and knee arthroscopy. Social History/Living Environment:     Pt lives with  in 2 story home. Prior Level of Function/Work/Activity:  Previous history of therapy in spring/ summer 2016. Walking with significant foot drop (without ankle brace intact) without use of assistive device except in unfamiliar surroundings  Dominant Side:         LEFT  Current Medications:    Current Outpatient Prescriptions:     HYDROcodone-acetaminophen (NORCO) 7.5-325 mg per tablet, Take 1 Tab by mouth two (2) times a day. Max Daily Amount: 2 Tabs., Disp: 60 Tab, Rfl: 0    HYDROcodone-acetaminophen (NORCO) 7.5-325 mg per tablet, Take 1 Tab by mouth two (2) times a day. Max Daily Amount: 2 Tabs., Disp: 60 Tab, Rfl: 0    HYDROcodone-acetaminophen (NORCO) 7.5-325 mg per tablet, Take 1 Tab by mouth two (2) times a day. Max Daily Amount: 2 Tabs., Disp: 60 Tab, Rfl: 0    atorvastatin (LIPITOR) 20 mg tablet, Take 1 Tab by mouth daily. Indications: hypercholesterolemia, Disp: 90 Tab, Rfl: 3    alendronate (FOSAMAX) 70 mg tablet, Take 1 Tab by mouth every seven (7) days.  Indications: POST-MENOPAUSAL OSTEOPOROSIS, Disp: 12 Tab, Rfl: 3    gabapentin (NEURONTIN) 400 mg capsule, Take 1 Cap by mouth three (3) times daily. , Disp: 270 Cap, Rfl: 3    buPROPion XL (WELLBUTRIN XL) 150 mg tablet, Take 1 Tab by mouth every morning., Disp: 90 Tab, Rfl: 0    cholecalciferol (VITAMIN D3) 1,000 unit cap, Take 1 Cap by mouth nightly. Indications: VITAMIN D DEFICIENCY, Disp: , Rfl:     diclofenac (VOLTAREN) 1 % gel, Apply topically 4 (four) times a day. Apply 2 grams to painful areas up to four times daily, Disp: , Rfl:     Multivit-Iron-Min-Folic Acid (CENTRUM COMPLETE) 18-0.4 mg tab, Take  by mouth.  Stop seven days prior to surgery per anesthesia protocol., Disp: , Rfl:    Date Last Reviewed:  6/23/2017   EXAMINATION:   Observation/Orthostatic Postural Assessment:          Pt of petite stature  Palpation:          No tenderness noted in R foot/ ankle however tightness \"roping\" and tenderness in lateral R calf  ROM:  BUE/ cervical WFL                     Date:  04/19/17 Date:  05/10/17 Date:     Trunk ROM WFL                          LE ROM Left Right       Hip Flexion [facility]       Hip Abduction [facility]       Straight Leg Raise (SLR) 90 ° 90 °                Knee Flexion [facility]       Knee Extension [facility] WFL                Ankle Inversion 35 ° 35 ° 35 35     Ankle Eversion 20 ° 7 ° 20 7     Ankle Planar Flexion 50 ° 40 ° 50 40     Ankle Dorsiflexion 20 ° 5 °** 20 5     ** compensated with eversion as weil  Strength:     Date:  04/19/17 Date:  05/10/17 Date:     LE MMT Left Right Left Right Left Right   Hip Flex (L1/L2) 4/5 4/5       Hip Abd (glut med) 4/5 4/5       Hip Ext 4/5 4/5       Quad    ( L3/4) 4/5 4/5       Hamstring 4/5 4/5       Anterior Tib (L4/L5) 4/5 2/5**  2+/5     Gastroc (S1/2) 4/5 2/5**  2+/5     EHL (L5) 4/5 2/5**  2+/5     ** indicates limited by AROM limitations                    Special Tests:  deferred  Neurological Screen:  Noted R side UE and LE with limited volitional control particularly in R foot/ ankle        Sensation: intact for light touch  Functional Mobility:         Gait/Ambulation:  Pt ambulating without A.D during assessment with noted foot drop (when observed without brace on R foot) Pt recently fitted for new soft brace by orthotist to help facilitate improved R foot alignment. Transfers:  Pt demonstrates sit to stand sit without use of hands from standard seat- supervised for increased effort and loading equally on BLE        Bed Mobility:  independent        Stairs:  Pt demonstrates reciprocal gait on stairs however not even wt bearing/ stance time. Balance:          Single Leg Stance:  R 2-3sec  L) 13 sec   Body Structures Involved:  1. Nerves  2. Joints  3. Muscles  4. Ligaments Body Functions Affected:  1. Neuromusculoskeletal  2. Movement Related Activities and Participation Affected:  1. General Tasks and Demands  2. Mobility  3. Self Care  4. Domestic Life  5. Community, Social and Civic Life   CLINICAL PRESENTATION:   CLINICAL DECISION MAKING:   Outcome Measure: Tool Used: Lower Extremity Functional Scale (LEFS)  Score:  Initial: 46/80 Most Recent: 50/80 (Date: 05/10/17 )   Interpretation of Score: 20 questions each scored on a 5 point scale with 0 representing \"extreme difficulty or unable to perform\" and 4 representing \"no difficulty\". The lower the score, the greater the functional disability. 80/80 represents no disability. Minimal detectable change is 9 points. Score 80 79-63 62-48 47-32 31-16 15-1 0   Modifier CH CI CJ CK CL CM CN     ? Mobility - Walking and Moving Around:     - CURRENT STATUS: CJ - 20%-39% impaired, limited or restricted    - GOAL STATUS: CJ - 20%-39% impaired, limited or restricted    - D/C STATUS:  ---------------To be determined---------------    Medical Necessity:   · Patient is expected to demonstrate progress in strength, range of motion and balance to increase independence with functional mobility safely.   Reason for Services/Other Comments:  · Patient continues to require modification of therapeutic interventions to increase complexity of exercises. TREATMENT:   (In addition to Assessment/Re-Assessment sessions the following treatments were rendered)    Therapeutic Exercise: (see flow sheet below for minutes):  Exercises per grid below to improve mobility, strength and balance. Required minimal visual and verbal cues to promote proper body alignment and promote proper body mechanics. Progressed resistance, range and repetitions as indicated. Aquatic Therapy (see flow sheet below for minutes): Aquatic treatment performed per flow grid for Decreased muscle strength, Decreased static/dynamic balance and reactive control and Ease of movement. Cues provided for technique. Assistance by therapist provided for progression of activities/ exercises. Patient has difficulty with R ankle/ balance. Date: 04/19/17 04/21/17 4/26/17 04/28/17 05/05/17 05/10/17 05/19/17 05/22/17 05/31/17   Modalities:                                                Manual Therapy: 15 min 15 min 20 min  15 min       STM to heel cord R heel cord To R heel cord/ peroneals (laterally) R heel cord/calf/peroneals 15         PROM PF/DF, INV/EV   5 min                     Aquatic Exercise:    45 min  60 min      Gait F/B/S/M    X4L each  x4L each F/B/S with dumb bells      Heel/Toe walk            4-way            PF/DF    x15  x20 BLE  x20 2 to 1 RLE      Hamstring Curls            Hip Flexion with knee ext.   (rockette)      x20 BLE      Squats      x15  x20      SLR march    x4LK  x4L      Lunges    X20 BLE  x4L      Hip circles            Hip horizontal abduction/adduction            Skipping (step hop)      x4L      Jogging       x20 slow christiano for coordination      Lateral skipping      x4L      Core:              Core:            Deep well bike      3 min      Deep well jumping georgie      2 min      Deep well scissors      2 min      Deep well:  traction            Deep well:  AROM Ankles      X15 each direction      Toe/ heel raises with Wonderboard    X15 R/L        Single leg marching with Wonderboard    X15 BLE        Hamstring Stretch    2H30 BLE  2H30 BLE      Heel cord stretch    2H30 BLE  2H30 BLE      Step Lunge            Piriformis stretch            PF Stretch            Balance: Single leg Stance            Balance: Tandem                                      Therapeutic Exercise: 10 min 40 min 40 min 15 min 45 min  55 min 60 min 55 min   Toe/ heel raises x15 BLE  x5 L/R with cues X15 BLE  X15 R/L singles x15 BLE ea  X15 BLE on floor/ AIREX  X15 BLE on flat/ Ariex x15 BLE     Heel cord stretch 2H20 BLE at bar  slant board 2H30  2H30 BLE on slant board   2H30 BLE    Eversion Seated with Red TB           TB (red) ankle ROM all directions  X15 RLE 4 way x15 red TB RLE  X15 R ankle with red TB  X15 in all directions red TB x20 in all directions with red TB X20 in all direction red TB   Airex marching  x15 x15  X15 BLE  X15 BLE on Airex x15 BLE on airex X15 BLE AIREX   Airex Heel to toe  x15 x15  X15 BLE   x15 LBE each in sit/ stand same   Tandem Stance  2H20   2H20 airex     2H30 BLE on both floor/ ARIEX 2H30 flat/ Airex same   Squats       X20 on AIREX x15 on Airex same   Lunges        x10 BLE    Single leg stance  2H20  2h20 airex with support      2H30 BLE  On flat/ airex    Gait training  2x20 ft tandem, high knee march, [place] F/B/ alternate x75 ft even ground X15 min with heel lifts of varying density  X10 min with new brace to promote proper alignment  x500 ft on outdoor varied surfaces 10 minutes over various inclines/ surfaces    Eccentric 2 to 1   RLE x15  X15 RLE  X15 RLE  X10 RLE on AIrex same    LAQ   x15 BLE  2x15 BLE 3#  X20 BLE 3#  x20 BLE 4#    Seated march   x15 BLE  2x15 BLE  X20 BLE 3# x20 BLE 4#    BOSU shoulder width stance       2H30 BLE     BOSU rocking side to side/ forward/ posterior       X15 R/L  X15 forward/ back     Rocker Board        x15 in all directions    SLR X15 BLE    SLR with ER        X15 BLE    TKE        x15H5 sec    Gait training with AFO        10 min                F=forward  B=Backward  S=sidesteps  M=marches    H=hold    Manual: (see flow sheet above for minutes)     HEP: Addition of single leg heel/ toe raises BID as well as instructed in use of red TB for eversion exercises- based on previous therapy intervention pt has had these exercises in past however reports \"I have forgotten them\"    Discontinuation Summary:  Pt has not returned to therapy since 05/31/17 scheduled visit. Pt cancelled today due to family illness. No further skilled therapy visits as patient's insurance authorization has lapsed. Plan to discontinue physical therapy services at present time with patient notified via phone when pt notified this therapist of family illness.        Carlos Ocasio, PT

## 2017-06-28 ENCOUNTER — APPOINTMENT (OUTPATIENT)
Dept: PHYSICAL THERAPY | Age: 62
End: 2017-06-28

## 2017-07-05 PROBLEM — I69.359 HEMIPLEGIA AFFECTING DOMINANT SIDE, POST-STROKE (HCC): Status: ACTIVE | Noted: 2017-07-05

## 2017-08-10 ENCOUNTER — HOSPITAL ENCOUNTER (OUTPATIENT)
Dept: MAMMOGRAPHY | Age: 62
Discharge: HOME | End: 2017-08-10
Attending: FAMILY MEDICINE
Payer: MEDICARE

## 2017-08-10 DIAGNOSIS — Z78.0 POSTMENOPAUSAL: ICD-10-CM

## 2017-08-10 DIAGNOSIS — Z12.39 SCREENING FOR BREAST CANCER: ICD-10-CM

## 2017-08-10 PROCEDURE — 77080 DXA BONE DENSITY AXIAL: CPT

## 2017-08-10 PROCEDURE — 77067 SCR MAMMO BI INCL CAD: CPT

## 2017-08-10 NOTE — PROGRESS NOTES
Bone density has improved.  Continue alendronate (fosamax) for another 2 years, repeat bone density at that time

## 2018-04-04 PROBLEM — J43.2 CENTRILOBULAR EMPHYSEMA (HCC): Status: ACTIVE | Noted: 2018-04-04

## 2018-04-18 PROBLEM — M77.50 BONE SPUR OF FOOT: Status: ACTIVE | Noted: 2018-04-18

## 2019-02-16 ENCOUNTER — HOSPITAL ENCOUNTER (OUTPATIENT)
Dept: MAMMOGRAPHY | Age: 64
Discharge: HOME | End: 2019-02-16
Attending: FAMILY MEDICINE
Payer: MEDICARE

## 2019-02-16 DIAGNOSIS — Z12.39 SCREENING FOR BREAST CANCER: ICD-10-CM

## 2019-02-16 PROCEDURE — 77067 SCR MAMMO BI INCL CAD: CPT

## 2019-09-23 ENCOUNTER — HOSPITAL ENCOUNTER (OUTPATIENT)
Dept: LAB | Age: 64
Discharge: HOME | End: 2019-09-23
Payer: MEDICARE

## 2019-09-23 DIAGNOSIS — D72.829 LEUKOCYTOSIS, UNSPECIFIED TYPE: ICD-10-CM

## 2019-09-23 LAB
ALBUMIN SERPL-MCNC: 4.2 G/DL (ref 3.2–4.6)
ALBUMIN/GLOB SERPL: 1.2 {RATIO} (ref 1.2–3.5)
ALP SERPL-CCNC: 98 U/L (ref 50–136)
ALT SERPL-CCNC: 17 U/L (ref 12–65)
ANION GAP SERPL CALC-SCNC: 8 MMOL/L (ref 7–16)
AST SERPL-CCNC: 16 U/L (ref 15–37)
BASOPHILS # BLD: 0.1 K/UL (ref 0–0.2)
BASOPHILS NFR BLD: 1 % (ref 0–2)
BILIRUB SERPL-MCNC: 0.4 MG/DL (ref 0.2–1.1)
BUN SERPL-MCNC: 4 MG/DL (ref 8–23)
CALCIUM SERPL-MCNC: 9.3 MG/DL (ref 8.3–10.4)
CHLORIDE SERPL-SCNC: 111 MMOL/L (ref 98–107)
CO2 SERPL-SCNC: 24 MMOL/L (ref 21–32)
CREAT SERPL-MCNC: 0.66 MG/DL (ref 0.6–1)
CRP SERPL-MCNC: <0.3 MG/DL (ref 0–0.9)
DIFFERENTIAL METHOD BLD: NORMAL
EOSINOPHIL # BLD: 0.1 K/UL (ref 0–0.8)
EOSINOPHIL NFR BLD: 1 % (ref 0.5–7.8)
ERYTHROCYTE [DISTWIDTH] IN BLOOD BY AUTOMATED COUNT: 13.1 % (ref 11.9–14.6)
ERYTHROCYTE [SEDIMENTATION RATE] IN BLOOD: 15 MM/HR (ref 0–30)
GLOBULIN SER CALC-MCNC: 3.5 G/DL (ref 2.3–3.5)
GLUCOSE SERPL-MCNC: 93 MG/DL (ref 65–100)
HCT VFR BLD AUTO: 42.4 % (ref 35.8–46.3)
HGB BLD-MCNC: 13.9 G/DL (ref 11.7–15.4)
IMM GRANULOCYTES # BLD AUTO: 0 K/UL (ref 0–0.5)
IMM GRANULOCYTES NFR BLD AUTO: 0 % (ref 0–5)
LYMPHOCYTES # BLD: 2.6 K/UL (ref 0.5–4.6)
LYMPHOCYTES NFR BLD: 25 % (ref 13–44)
Lab: NORMAL
MCH RBC QN AUTO: 31.7 PG (ref 26.1–32.9)
MCHC RBC AUTO-ENTMCNC: 32.8 G/DL (ref 31.4–35)
MCV RBC AUTO: 96.6 FL (ref 79.6–97.8)
MONOCYTES # BLD: 0.7 K/UL (ref 0.1–1.3)
MONOCYTES NFR BLD: 6 % (ref 4–12)
NEUTS SEG # BLD: 7.1 K/UL (ref 1.7–8.2)
NEUTS SEG NFR BLD: 67 % (ref 43–78)
NRBC # BLD: 0 K/UL (ref 0–0.2)
PLATELET # BLD AUTO: 215 K/UL (ref 150–450)
PMV BLD AUTO: 11.6 FL (ref 9.4–12.3)
POTASSIUM SERPL-SCNC: 3.6 MMOL/L (ref 3.5–5.1)
PROT SERPL-MCNC: 7.7 G/DL (ref 6.3–8.2)
RBC # BLD AUTO: 4.39 M/UL (ref 4.05–5.25)
REFERENCE LAB,REFLB: NORMAL
SODIUM SERPL-SCNC: 143 MMOL/L (ref 136–145)
TEST DESCRIPTION:,ATST: NORMAL
WBC # BLD AUTO: 10.6 K/UL (ref 4.3–11.1)

## 2019-09-23 PROCEDURE — 85025 COMPLETE CBC W/AUTO DIFF WBC: CPT

## 2019-09-23 PROCEDURE — 36415 COLL VENOUS BLD VENIPUNCTURE: CPT

## 2019-09-23 PROCEDURE — 88184 FLOWCYTOMETRY/ TC 1 MARKER: CPT

## 2019-09-23 PROCEDURE — 86140 C-REACTIVE PROTEIN: CPT

## 2019-09-23 PROCEDURE — 88185 FLOWCYTOMETRY/TC ADD-ON: CPT

## 2019-09-23 PROCEDURE — 85652 RBC SED RATE AUTOMATED: CPT

## 2019-09-23 PROCEDURE — 80053 COMPREHEN METABOLIC PANEL: CPT

## 2019-09-25 LAB
FLOW CYTOMETRY, FBTC1: NORMAL
PATH REV BLD -IMP: NORMAL
SPECIMEN SOURCE: NORMAL
TEST ORDERED:: NORMAL

## 2019-11-05 ENCOUNTER — HOSPITAL ENCOUNTER (OUTPATIENT)
Dept: LAB | Age: 64
Discharge: HOME | End: 2019-11-05
Payer: MEDICARE

## 2019-11-05 DIAGNOSIS — D72.829 LEUKOCYTOSIS, UNSPECIFIED TYPE: ICD-10-CM

## 2019-11-05 LAB
ALBUMIN SERPL-MCNC: 4.1 G/DL (ref 3.2–4.6)
ALBUMIN/GLOB SERPL: 1.2 {RATIO} (ref 1.2–3.5)
ALP SERPL-CCNC: 90 U/L (ref 50–136)
ALT SERPL-CCNC: 18 U/L (ref 12–65)
ANION GAP SERPL CALC-SCNC: 6 MMOL/L (ref 7–16)
AST SERPL-CCNC: 10 U/L (ref 15–37)
BASOPHILS # BLD: 0 K/UL (ref 0–0.2)
BASOPHILS NFR BLD: 0 % (ref 0–2)
BILIRUB SERPL-MCNC: 0.5 MG/DL (ref 0.2–1.1)
BUN SERPL-MCNC: 7 MG/DL (ref 8–23)
CALCIUM SERPL-MCNC: 8.7 MG/DL (ref 8.3–10.4)
CHLORIDE SERPL-SCNC: 109 MMOL/L (ref 98–107)
CO2 SERPL-SCNC: 28 MMOL/L (ref 21–32)
CREAT SERPL-MCNC: 0.74 MG/DL (ref 0.6–1)
DIFFERENTIAL METHOD BLD: ABNORMAL
EOSINOPHIL # BLD: 0.1 K/UL (ref 0–0.8)
EOSINOPHIL NFR BLD: 1 % (ref 0.5–7.8)
ERYTHROCYTE [DISTWIDTH] IN BLOOD BY AUTOMATED COUNT: 13 % (ref 11.9–14.6)
GLOBULIN SER CALC-MCNC: 3.3 G/DL (ref 2.3–3.5)
GLUCOSE SERPL-MCNC: 79 MG/DL (ref 65–100)
HCT VFR BLD AUTO: 42.1 % (ref 35.8–46.3)
HGB BLD-MCNC: 13.6 G/DL (ref 11.7–15.4)
IMM GRANULOCYTES # BLD AUTO: 0.1 K/UL (ref 0–0.5)
IMM GRANULOCYTES NFR BLD AUTO: 0 % (ref 0–5)
LYMPHOCYTES # BLD: 3.3 K/UL (ref 0.5–4.6)
LYMPHOCYTES NFR BLD: 25 % (ref 13–44)
MCH RBC QN AUTO: 31.9 PG (ref 26.1–32.9)
MCHC RBC AUTO-ENTMCNC: 32.3 G/DL (ref 31.4–35)
MCV RBC AUTO: 98.6 FL (ref 79.6–97.8)
MONOCYTES # BLD: 0.7 K/UL (ref 0.1–1.3)
MONOCYTES NFR BLD: 5 % (ref 4–12)
NEUTS SEG # BLD: 8.8 K/UL (ref 1.7–8.2)
NEUTS SEG NFR BLD: 68 % (ref 43–78)
NRBC # BLD: 0 K/UL (ref 0–0.2)
PLATELET # BLD AUTO: 178 K/UL (ref 150–450)
PMV BLD AUTO: 11.8 FL (ref 9.4–12.3)
POTASSIUM SERPL-SCNC: 3.8 MMOL/L (ref 3.5–5.1)
PROT SERPL-MCNC: 7.4 G/DL (ref 6.3–8.2)
RBC # BLD AUTO: 4.27 M/UL (ref 4.05–5.25)
SODIUM SERPL-SCNC: 143 MMOL/L (ref 136–145)
WBC # BLD AUTO: 13 K/UL (ref 4.3–11.1)

## 2019-11-05 PROCEDURE — 36415 COLL VENOUS BLD VENIPUNCTURE: CPT

## 2019-11-05 PROCEDURE — 85025 COMPLETE CBC W/AUTO DIFF WBC: CPT

## 2019-11-05 PROCEDURE — 80053 COMPREHEN METABOLIC PANEL: CPT

## 2020-09-01 ENCOUNTER — HOSPITAL ENCOUNTER (OUTPATIENT)
Dept: CT IMAGING | Age: 65
Discharge: HOME | End: 2020-09-01
Attending: NEUROLOGICAL SURGERY
Payer: MEDICARE

## 2020-09-01 DIAGNOSIS — I67.1 CEREBRAL ANEURYSM: ICD-10-CM

## 2020-09-01 LAB — CREAT BLD-MCNC: 0.7 MG/DL (ref 0.8–1.5)

## 2020-09-01 PROCEDURE — 74011000636 HC RX REV CODE- 636: Performed by: NEUROLOGICAL SURGERY

## 2020-09-01 PROCEDURE — 70496 CT ANGIOGRAPHY HEAD: CPT

## 2020-09-01 PROCEDURE — 82565 ASSAY OF CREATININE: CPT

## 2020-09-01 PROCEDURE — 74011000258 HC RX REV CODE- 258: Performed by: NEUROLOGICAL SURGERY

## 2020-09-01 RX ORDER — SODIUM CHLORIDE 0.9 % (FLUSH) 0.9 %
10 SYRINGE (ML) INJECTION
Status: COMPLETED | OUTPATIENT
Start: 2020-09-01 | End: 2020-09-01

## 2020-09-01 RX ADMIN — Medication 10 ML: at 10:10

## 2020-09-01 RX ADMIN — IOPAMIDOL 80 ML: 755 INJECTION, SOLUTION INTRAVENOUS at 10:10

## 2020-09-01 RX ADMIN — SODIUM CHLORIDE 100 ML: 900 INJECTION, SOLUTION INTRAVENOUS at 10:10

## 2021-01-01 ENCOUNTER — APPOINTMENT (OUTPATIENT)
Dept: CT IMAGING | Age: 66
End: 2021-01-01
Attending: PHYSICIAN ASSISTANT
Payer: MEDICARE

## 2021-01-01 ENCOUNTER — HOSPITAL ENCOUNTER (EMERGENCY)
Age: 66
Discharge: HOME | End: 2021-01-01
Attending: EMERGENCY MEDICINE
Payer: MEDICARE

## 2021-01-01 VITALS
TEMPERATURE: 98.2 F | HEART RATE: 78 BPM | WEIGHT: 110 LBS | SYSTOLIC BLOOD PRESSURE: 118 MMHG | OXYGEN SATURATION: 96 % | DIASTOLIC BLOOD PRESSURE: 73 MMHG | RESPIRATION RATE: 16 BRPM | HEIGHT: 63 IN | BODY MASS INDEX: 19.49 KG/M2

## 2021-01-01 DIAGNOSIS — N20.0 KIDNEY STONE: Primary | ICD-10-CM

## 2021-01-01 LAB
ALBUMIN SERPL-MCNC: 4.1 G/DL (ref 3.2–4.6)
ALBUMIN/GLOB SERPL: 1.1 {RATIO} (ref 1.2–3.5)
ALP SERPL-CCNC: 104 U/L (ref 50–136)
ALT SERPL-CCNC: 18 U/L (ref 12–65)
ANION GAP SERPL CALC-SCNC: 5 MMOL/L (ref 7–16)
AST SERPL-CCNC: 15 U/L (ref 15–37)
BACTERIA URNS QL MICRO: 0 /HPF
BASOPHILS # BLD: 0.1 K/UL (ref 0–0.2)
BASOPHILS NFR BLD: 1 % (ref 0–2)
BILIRUB SERPL-MCNC: 0.5 MG/DL (ref 0.2–1.1)
BUN SERPL-MCNC: 10 MG/DL (ref 8–23)
CALCIUM SERPL-MCNC: 8.9 MG/DL (ref 8.3–10.4)
CASTS URNS QL MICRO: 0 /LPF
CHLORIDE SERPL-SCNC: 110 MMOL/L (ref 98–107)
CO2 SERPL-SCNC: 25 MMOL/L (ref 21–32)
CREAT SERPL-MCNC: 0.81 MG/DL (ref 0.6–1)
DIFFERENTIAL METHOD BLD: NORMAL
EOSINOPHIL # BLD: 0.1 K/UL (ref 0–0.8)
EOSINOPHIL NFR BLD: 1 % (ref 0.5–7.8)
EPI CELLS #/AREA URNS HPF: ABNORMAL /HPF
ERYTHROCYTE [DISTWIDTH] IN BLOOD BY AUTOMATED COUNT: 13 % (ref 11.9–14.6)
GLOBULIN SER CALC-MCNC: 3.9 G/DL (ref 2.3–3.5)
GLUCOSE SERPL-MCNC: 89 MG/DL (ref 65–100)
HCT VFR BLD AUTO: 40.8 % (ref 35.8–46.3)
HGB BLD-MCNC: 13.9 G/DL (ref 11.7–15.4)
IMM GRANULOCYTES # BLD AUTO: 0.1 K/UL (ref 0–0.5)
IMM GRANULOCYTES NFR BLD AUTO: 1 % (ref 0–5)
LYMPHOCYTES # BLD: 2.3 K/UL (ref 0.5–4.6)
LYMPHOCYTES NFR BLD: 22 % (ref 13–44)
MCH RBC QN AUTO: 32.3 PG (ref 26.1–32.9)
MCHC RBC AUTO-ENTMCNC: 34.1 G/DL (ref 31.4–35)
MCV RBC AUTO: 94.7 FL (ref 79.6–97.8)
MONOCYTES # BLD: 0.9 K/UL (ref 0.1–1.3)
MONOCYTES NFR BLD: 9 % (ref 4–12)
NEUTS SEG # BLD: 6.9 K/UL (ref 1.7–8.2)
NEUTS SEG NFR BLD: 68 % (ref 43–78)
NRBC # BLD: 0 K/UL (ref 0–0.2)
PLATELET # BLD AUTO: 227 K/UL (ref 150–450)
PMV BLD AUTO: 11.5 FL (ref 9.4–12.3)
POTASSIUM SERPL-SCNC: 3.7 MMOL/L (ref 3.5–5.1)
PROT SERPL-MCNC: 8 G/DL (ref 6.3–8.2)
RBC # BLD AUTO: 4.31 M/UL (ref 4.05–5.2)
RBC #/AREA URNS HPF: >100 /HPF
SODIUM SERPL-SCNC: 140 MMOL/L (ref 136–145)
WBC # BLD AUTO: 10.2 K/UL (ref 4.3–11.1)
WBC URNS QL MICRO: ABNORMAL /HPF

## 2021-01-01 PROCEDURE — 80053 COMPREHEN METABOLIC PANEL: CPT

## 2021-01-01 PROCEDURE — 85025 COMPLETE CBC W/AUTO DIFF WBC: CPT

## 2021-01-01 PROCEDURE — 81015 MICROSCOPIC EXAM OF URINE: CPT

## 2021-01-01 PROCEDURE — 81003 URINALYSIS AUTO W/O SCOPE: CPT

## 2021-01-01 PROCEDURE — 99284 EMERGENCY DEPT VISIT MOD MDM: CPT

## 2021-01-01 PROCEDURE — 74176 CT ABD & PELVIS W/O CONTRAST: CPT

## 2021-01-01 RX ORDER — CYCLOBENZAPRINE HCL 10 MG
10 TABLET ORAL
Qty: 15 TAB | Refills: 0 | Status: SHIPPED | OUTPATIENT
Start: 2021-01-01 | End: 2021-01-04 | Stop reason: ALTCHOICE

## 2021-01-01 RX ORDER — TAMSULOSIN HYDROCHLORIDE 0.4 MG/1
0.4 CAPSULE ORAL DAILY
Qty: 7 CAP | Refills: 0 | Status: SHIPPED | OUTPATIENT
Start: 2021-01-01 | End: 2021-01-04 | Stop reason: SDUPTHER

## 2021-01-01 NOTE — ED TRIAGE NOTES
Patient ambulatory to triage without difficulty; mask in place. Patient reports LLQ pain and blood in urine since last Friday. States she has a kidney infection and has been on cephalexin for infection.

## 2021-01-01 NOTE — ED NOTES
I have reviewed discharge instructions with the patient. The patient verbalized understanding. Patient left ED via Discharge Method: ambulatory to Home with family. Opportunity for questions and clarification provided. Patient given 2 scripts. To continue your aftercare when you leave the hospital, you may receive an automated call from our care team to check in on how you are doing. This is a free service and part of our promise to provide the best care and service to meet your aftercare needs.  If you have questions, or wish to unsubscribe from this service please call [phone]. Thank you for Choosing our [facility].

## 2021-01-01 NOTE — DISCHARGE INSTRUCTIONS
Use meds as directed along with at home pain meds, return to er if pain not controlled, keep appto n Monday with urology , strain urine

## 2021-01-01 NOTE — ED PROVIDER NOTES
Noticed some blood in her urine 1 week ago with some mild dysuria. She got her primary care doctor days ago who placed her on Cipro he did send the urine for culture. She has continued hematuria and mild left lower quadrant pain no fever no nausea no chest pain or shortness of breath, primary care has referred her to urology and she has an appointment on Monday. Is here for further evaluation. She has had a hysterectomy she denies any bloody stool or blood from vagina, no weakness or dizziness    The history is provided by the patient. Blood in Urine   This is a new problem. Episode onset: 1 week  The problem occurs every urination. The problem has been gradually worsening. The quality of the pain is described as aching. The pain is at a severity of 4/10. The pain is mild. There has been no fever. Associated symptoms include hematuria. Pertinent negatives include no hesitancy and no urgency. The patient is not pregnant. She has tried antibiotics for the symptoms. The treatment provided no relief. Her past medical history does not include kidney stones, recurrent UTIs or catheterization.         Past Medical History:   Diagnosis Date    Anterior cerebral aneurysm 2018    above [facility] eye    Anxiety disorder     Back pain     Brain aneurysm     times  2 - one ruptured in surgery and caused a stroke     Cerebral hemorrhage (HCC)     History of drug dependence ([address]     opioid type, continuous    History of vitamin D deficiency     Hypercholesterolemia     Joint pain     right knee     Personal history of colonic polyps     removed     Stroke ([address]     9-2014 - stroke during surgery - lasting right side weakness     Unspecified late effects of cerebrovascular disease        Past Surgical History:   Procedure Laterality Date    BREAST SURGERY PROCEDURE UNLISTED      HX COLONOSCOPY      HX GYN      HX ORTHOPAEDIC      NEUROLOGICAL PROCEDURE UNLISTED           Family History:   Problem Relation Age of Onset    Heart Attack Father     Heart Disease Father     Elevated Lipids Father     Breast Cancer Paternal Grandmother        Social History     Socioeconomic History    Marital status:      Spouse name: Not on file    Number of children: Not on file    Years of education: Not on file    Highest education level: Not on file   Occupational History    Not on file   Social Needs    Financial resource strain: Not on file    Food insecurity     Worry: Not on file     Inability: Not on file   [employer] needs     Medical: Not on file     Non-medical: Not on file   Tobacco Use    Smoking status: Former Smoker     Packs/day: 1.00     Years: 30.00     Pack years: 30.00     Types: Cigarettes     Quit date: 2015     Years since quittin.7    Smokeless tobacco: Never Used   Substance and Sexual Activity    Alcohol use: No    Drug use: No    Sexual activity: Not on file   Lifestyle    Physical activity     Days per week: Not on file     Minutes per session: Not on file    Stress: Not on file   Relationships    Social connections     Talks on phone: Not on file     Gets together: Not on file     Attends Taoism service: Not on file     Active member of club or organization: Not on file     Attends meetings of clubs or organizations: Not on file     Relationship status: Not on file    Intimate partner violence     Fear of current or ex partner: Not on file     Emotionally abused: Not on file     Physically abused: Not on file     Forced sexual activity: Not on file   Other Topics Concern    Not on file   Social History Narrative    Not on file         ALLERGIES: Zanaflex [tizanidine]    Review of Systems   Genitourinary: Positive for hematuria. Negative for hesitancy and urgency. All other systems reviewed and are negative.       Vitals:    21 1141   BP: (!) 149/80   Pulse: (!) 107   Resp: 16   Temp: 97.9 °F (36.6 °C)   SpO2: 97%   Weight: 49.9 kg (110 lb)   Height: 5' 3\" (1.6 m)            Physical Exam  Vitals signs and nursing note reviewed. Constitutional:       General: She is not in acute distress. Appearance: Normal appearance. She is well-developed and normal weight. She is not diaphoretic. HENT:      Head: Normocephalic and atraumatic. Eyes:      Pupils: Pupils are equal, round, and reactive to light. Neck:      Musculoskeletal: Normal range of motion and neck supple. Cardiovascular:      Rate and Rhythm: Normal rate and regular rhythm. Pulmonary:      Effort: Pulmonary effort is normal.      Breath sounds: Normal breath sounds. Abdominal:      General: Abdomen is flat. Bowel sounds are normal.      Palpations: Abdomen is soft. Tenderness: There is abdominal tenderness. Comments: Mild discomfort to palpation left lower quadrant area no masses no guarding pain does radiate to the left flank area. No skin changes. No rebound positive bowel sounds   Musculoskeletal: Normal range of motion. Skin:     General: Skin is warm. Neurological:      General: No focal deficit present. Mental Status: She is alert and oriented to person, place, and time. Psychiatric:         Mood and Affect: Mood normal.         Behavior: Behavior normal.          MDM  Number of Diagnoses or Management Options  Diagnosis management comments: Cbc, cmp normal, urine with blood, recent culture negative  Urogram  CT UROGRAM WO CONT   Final Result    IMPRESSION:    1. 3 x 5 mm stone at the junction of the mid and distal thirds of the left    ureter with mild-to-moderate hydronephrosis. 2. Small right nephrolith. 3. Atherosclerosis.    pt to strain urine, keep appt on Monday with Dr. Lianne Chávez  Pt discussed with Dr. Damari Morris       Amount and/or Complexity of Data Reviewed  Clinical lab tests: ordered and reviewed  Tests in the radiology section of CPT®: ordered  Review and summarize past medical records: yes    Risk of Complications, Morbidity, and/or Mortality  Presenting problems: moderate  Diagnostic procedures: low  Management options: low    Patient Progress  Patient progress: improved         Procedures

## 2021-01-07 ENCOUNTER — HOSPITAL ENCOUNTER (OUTPATIENT)
Dept: ULTRASOUND IMAGING | Age: 66
Discharge: HOME | End: 2021-01-07
Attending: UROLOGY
Payer: MEDICARE

## 2021-01-07 DIAGNOSIS — N20.1 LEFT URETERAL STONE: ICD-10-CM

## 2021-01-07 PROCEDURE — 76770 US EXAM ABDO BACK WALL COMP: CPT

## 2021-06-07 ENCOUNTER — HOSPITAL ENCOUNTER (OUTPATIENT)
Dept: MAMMOGRAPHY | Age: 66
Discharge: HOME | End: 2021-06-07
Attending: FAMILY MEDICINE
Payer: MEDICARE

## 2021-06-07 DIAGNOSIS — Z12.31 ENCOUNTER FOR SCREENING MAMMOGRAM FOR MALIGNANT NEOPLASM OF BREAST: ICD-10-CM

## 2021-06-07 PROCEDURE — 77067 SCR MAMMO BI INCL CAD: CPT

## 2022-03-19 PROBLEM — J43.2 CENTRILOBULAR EMPHYSEMA (HCC): Status: ACTIVE | Noted: 2018-04-04

## 2022-03-19 PROBLEM — M77.50 BONE SPUR OF FOOT: Status: ACTIVE | Noted: 2018-04-18

## 2022-03-20 PROBLEM — I69.359 HEMIPLEGIA AFFECTING DOMINANT SIDE, POST-STROKE (HCC): Status: ACTIVE | Noted: 2017-07-05

## 2022-03-30 ENCOUNTER — HOSPITAL ENCOUNTER (OUTPATIENT)
Dept: CT IMAGING | Age: 67
Discharge: HOME | End: 2022-03-30
Attending: NURSE PRACTITIONER
Payer: MEDICARE

## 2022-03-30 DIAGNOSIS — R31.9 HEMATURIA, UNSPECIFIED TYPE: ICD-10-CM

## 2022-03-30 DIAGNOSIS — R10.30 LOWER ABDOMINAL PAIN: ICD-10-CM

## 2022-03-30 PROBLEM — E83.50 CALCIUM DISORDER: Status: ACTIVE | Noted: 2022-03-30

## 2022-03-30 PROCEDURE — 74176 CT ABD & PELVIS W/O CONTRAST: CPT

## 2022-03-30 NOTE — PROGRESS NOTES
Please let patient know she has a 9 mm obstructing stone in her right kidney with mild hydronephrosis. She has another cluster of stones forming in another place in her right kidney as well. Will send an urgent referral to Urology. Prescription for flomax sent to her pharmacy.     Rachel Rivera NP

## 2022-04-06 ENCOUNTER — ANESTHESIA (OUTPATIENT)
Dept: SURGERY | Age: 67
End: 2022-04-06
Payer: MEDICARE

## 2022-04-06 ENCOUNTER — ANESTHESIA EVENT (OUTPATIENT)
Dept: SURGERY | Age: 67
End: 2022-04-06
Payer: MEDICARE

## 2022-04-06 ENCOUNTER — HOSPITAL ENCOUNTER (OUTPATIENT)
Age: 67
Setting detail: OUTPATIENT SURGERY
Discharge: HOME | End: 2022-04-06
Attending: UROLOGY | Admitting: UROLOGY
Payer: MEDICARE

## 2022-04-06 VITALS
BODY MASS INDEX: 18.4 KG/M2 | RESPIRATION RATE: 19 BRPM | DIASTOLIC BLOOD PRESSURE: 77 MMHG | HEART RATE: 78 BPM | TEMPERATURE: 98.3 F | SYSTOLIC BLOOD PRESSURE: 141 MMHG | HEIGHT: 64 IN | OXYGEN SATURATION: 95 % | WEIGHT: 107.8 LBS

## 2022-04-06 DIAGNOSIS — N20.0 RIGHT RENAL STONE: Primary | ICD-10-CM

## 2022-04-06 LAB
ANION GAP SERPL CALC-SCNC: 4 MMOL/L (ref 7–16)
APPEARANCE UR: CLEAR
BILIRUB UR QL: ABNORMAL
BUN SERPL-MCNC: 10 MG/DL (ref 8–23)
CALCIUM SERPL-MCNC: 8.9 MG/DL (ref 8.3–10.4)
CHLORIDE SERPL-SCNC: 112 MMOL/L (ref 98–107)
CO2 SERPL-SCNC: 27 MMOL/L (ref 21–32)
COLOR UR: YELLOW
CREAT SERPL-MCNC: 0.6 MG/DL (ref 0.6–1)
ERYTHROCYTE [DISTWIDTH] IN BLOOD BY AUTOMATED COUNT: 12.9 % (ref 11.9–14.6)
GLUCOSE SERPL-MCNC: 81 MG/DL (ref 65–100)
GLUCOSE UR STRIP.AUTO-MCNC: NEGATIVE MG/DL
HCT VFR BLD AUTO: 39.8 % (ref 35.8–46.3)
HGB BLD-MCNC: 12.9 G/DL (ref 11.7–15.4)
HGB UR QL STRIP: NEGATIVE
KETONES UR QL STRIP.AUTO: NEGATIVE MG/DL
LEUKOCYTE ESTERASE UR QL STRIP.AUTO: NEGATIVE
MCH RBC QN AUTO: 31 PG (ref 26.1–32.9)
MCHC RBC AUTO-ENTMCNC: 32.4 G/DL (ref 31.4–35)
MCV RBC AUTO: 95.7 FL (ref 79.6–97.8)
NITRITE UR QL STRIP.AUTO: NEGATIVE
NRBC # BLD: 0 K/UL (ref 0–0.2)
PH UR STRIP: 6 [PH] (ref 5–9)
PLATELET # BLD AUTO: 220 K/UL (ref 150–450)
PMV BLD AUTO: 11.7 FL (ref 9.4–12.3)
POTASSIUM SERPL-SCNC: 3.4 MMOL/L (ref 3.5–5.1)
PROT UR STRIP-MCNC: NEGATIVE MG/DL
RBC # BLD AUTO: 4.16 M/UL (ref 4.05–5.2)
SODIUM SERPL-SCNC: 143 MMOL/L (ref 136–145)
SP GR UR REFRACTOMETRY: 1.02 (ref 1–1.02)
UROBILINOGEN UR QL STRIP.AUTO: 0.2 EU/DL (ref 0.2–1)
WBC # BLD AUTO: 9.7 K/UL (ref 4.3–11.1)

## 2022-04-06 PROCEDURE — 76060000033 HC ANESTHESIA 1 TO 1.5 HR: Performed by: UROLOGY

## 2022-04-06 PROCEDURE — 77030040361 HC SLV COMPR DVT MDII -B: Performed by: UROLOGY

## 2022-04-06 PROCEDURE — 80048 BASIC METABOLIC PNL TOTAL CA: CPT

## 2022-04-06 PROCEDURE — 74011250636 HC RX REV CODE- 250/636: Performed by: UROLOGY

## 2022-04-06 PROCEDURE — 50590 FRAGMENTING OF KIDNEY STONE: CPT | Performed by: UROLOGY

## 2022-04-06 PROCEDURE — 74011250636 HC RX REV CODE- 250/636: Performed by: NURSE ANESTHETIST, CERTIFIED REGISTERED

## 2022-04-06 PROCEDURE — 77030010509 HC AIRWY LMA MSK TELE -A: Performed by: ANESTHESIOLOGY

## 2022-04-06 PROCEDURE — 85027 COMPLETE CBC AUTOMATED: CPT

## 2022-04-06 PROCEDURE — 74011000250 HC RX REV CODE- 250: Performed by: NURSE ANESTHETIST, CERTIFIED REGISTERED

## 2022-04-06 PROCEDURE — 76010000138 HC OR TIME 0.5 TO 1 HR: Performed by: UROLOGY

## 2022-04-06 PROCEDURE — 81003 URINALYSIS AUTO W/O SCOPE: CPT

## 2022-04-06 PROCEDURE — 76210000063 HC OR PH I REC FIRST 0.5 HR: Performed by: UROLOGY

## 2022-04-06 PROCEDURE — 76210000020 HC REC RM PH II FIRST 0.5 HR: Performed by: UROLOGY

## 2022-04-06 RX ORDER — HYDROCODONE BITARTRATE AND ACETAMINOPHEN 7.5; 325 MG/1; MG/1
1 TABLET ORAL
Qty: 20 TABLET | Refills: 0 | Status: SHIPPED | OUTPATIENT
Start: 2022-04-06 | End: 2022-04-09

## 2022-04-06 RX ORDER — PROPOFOL 10 MG/ML
INJECTION, EMULSION INTRAVENOUS AS NEEDED
Status: DISCONTINUED | OUTPATIENT
Start: 2022-04-06 | End: 2022-04-06 | Stop reason: HOSPADM

## 2022-04-06 RX ORDER — EPHEDRINE SULFATE/0.9% NACL/PF 50 MG/5 ML
SYRINGE (ML) INTRAVENOUS AS NEEDED
Status: DISCONTINUED | OUTPATIENT
Start: 2022-04-06 | End: 2022-04-06 | Stop reason: HOSPADM

## 2022-04-06 RX ORDER — CEFAZOLIN SODIUM/WATER 2 G/20 ML
2 SYRINGE (ML) INTRAVENOUS ONCE
Status: COMPLETED | OUTPATIENT
Start: 2022-04-06 | End: 2022-04-06

## 2022-04-06 RX ORDER — ONDANSETRON 2 MG/ML
INJECTION INTRAMUSCULAR; INTRAVENOUS AS NEEDED
Status: DISCONTINUED | OUTPATIENT
Start: 2022-04-06 | End: 2022-04-06 | Stop reason: HOSPADM

## 2022-04-06 RX ORDER — LIDOCAINE HYDROCHLORIDE 20 MG/ML
INJECTION, SOLUTION EPIDURAL; INFILTRATION; INTRACAUDAL; PERINEURAL AS NEEDED
Status: DISCONTINUED | OUTPATIENT
Start: 2022-04-06 | End: 2022-04-06 | Stop reason: HOSPADM

## 2022-04-06 RX ORDER — DEXAMETHASONE SODIUM PHOSPHATE 4 MG/ML
INJECTION, SOLUTION INTRA-ARTICULAR; INTRALESIONAL; INTRAMUSCULAR; INTRAVENOUS; SOFT TISSUE AS NEEDED
Status: DISCONTINUED | OUTPATIENT
Start: 2022-04-06 | End: 2022-04-06 | Stop reason: HOSPADM

## 2022-04-06 RX ORDER — SODIUM CHLORIDE, SODIUM LACTATE, POTASSIUM CHLORIDE, CALCIUM CHLORIDE 600; 310; 30; 20 MG/100ML; MG/100ML; MG/100ML; MG/100ML
50 INJECTION, SOLUTION INTRAVENOUS CONTINUOUS
Status: DISCONTINUED | OUTPATIENT
Start: 2022-04-06 | End: 2022-04-08 | Stop reason: HOSPADM

## 2022-04-06 RX ADMIN — Medication 2 G: at 16:58

## 2022-04-06 RX ADMIN — Medication 10 MG: at 17:21

## 2022-04-06 RX ADMIN — SODIUM CHLORIDE, SODIUM LACTATE, POTASSIUM CHLORIDE, AND CALCIUM CHLORIDE 50 ML/HR: 600; 310; 30; 20 INJECTION, SOLUTION INTRAVENOUS at 15:22

## 2022-04-06 RX ADMIN — DEXAMETHASONE SODIUM PHOSPHATE 4 MG: 4 INJECTION, SOLUTION INTRAMUSCULAR; INTRAVENOUS at 17:04

## 2022-04-06 RX ADMIN — LIDOCAINE HYDROCHLORIDE 100 MG: 20 INJECTION, SOLUTION EPIDURAL; INFILTRATION; INTRACAUDAL; PERINEURAL at 16:51

## 2022-04-06 RX ADMIN — PROPOFOL 200 MG: 10 INJECTION, EMULSION INTRAVENOUS at 16:51

## 2022-04-06 RX ADMIN — Medication 10 MG: at 17:15

## 2022-04-06 RX ADMIN — Medication 10 MG: at 16:58

## 2022-04-06 RX ADMIN — Medication 12.5 MG: at 17:06

## 2022-04-06 RX ADMIN — Medication 5 MG: at 17:28

## 2022-04-06 RX ADMIN — ONDANSETRON 4 MG: 2 INJECTION INTRAMUSCULAR; INTRAVENOUS at 17:04

## 2022-04-06 NOTE — ANESTHESIA PREPROCEDURE EVALUATION
Relevant Problems   No relevant active problems       Anesthetic History   No history of anesthetic complications            Review of Systems / Medical History  Patient summary reviewed and pertinent labs reviewed    Pulmonary    COPD: mild      Smoker         Neuro/Psych       CVA (right side weakness)      Comments: Anterior cerebral aneurysm s/p stenting Cardiovascular  Within defined limits                Exercise tolerance: >4 METS     GI/Hepatic/Renal         Renal disease: stones       Endo/Other        Arthritis     Other Findings              Physical Exam    Airway  Mallampati: II  TM Distance: 4 - 6 cm  Neck ROM: normal range of motion   Mouth opening: Normal     Cardiovascular  Regular rate and rhythm,  S1 and S2 normal,  no murmur, click, rub, or gallop             Dental         Pulmonary  Breath sounds clear to auscultation               Abdominal         Other Findings            Anesthetic Plan    ASA: 3  Anesthesia type: general          Induction: Intravenous  Anesthetic plan and risks discussed with: Patient and Spouse

## 2022-04-06 NOTE — DISCHARGE INSTRUCTIONS
Lithotripsy: What to Expect at [address] [place] [address] is a way to treat kidney stones without surgery. It is also called extracorporeal shock wave lithotripsy, or ESWL. This treatment uses sound waves to break kidney stones into tiny pieces. These pieces can then pass out of the body in the urine. You may have a small amount of blood in your urine after this treatment. Your urine may be slightly pink or reddish. The blood in the urine often goes away after 2 days. This care sheet gives you a general idea about how long it will take for you to recover. But each person recovers at a different pace. Follow the steps below to feel better as quickly as possible. Activity  · Rest as much as you need to after you go home. · You may do your regular activities. But avoid hard exercise or sports for a week. Diet  · You can eat your normal diet. · Drink plenty of fluids, enough so that your urine is light yellow or clear like water. If you have kidney, heart, or liver disease and have to limit fluids, talk with your doctor before you increase the amount of fluids you drink. Medicines  · Your doctor will tell you if and when you can restart your medicines. He or she will also give you instructions about taking any new medicines. · If you take blood thinners, such as warfarin (Coumadin), clopidogrel (Plavix), or aspirin, be sure to talk to your doctor. He or she will tell you if and when to start taking those medicines again. Make sure that you understand exactly what your doctor wants you to do. · Be safe with medicines. Read and follow all instructions on the label. ¨ If the doctor gave you a prescription medicine for pain, take it as prescribed. ¨ If you are not taking a prescription pain medicine, ask your doctor if you can take acetaminophen (Tylenol). Do not take ibuprofen (Advil, Motrin) or naproxen (Aleve), or similar medicines unless your doctor tells you to.   ¨ Do not take two or more pain medicines at the same time unless the doctor told you to. Many pain medicines have acetaminophen, which is Tylenol. Too much acetaminophen (Tylenol) can be harmful. Medication Interaction:  During your procedure you potentially received a medication or medications which may reduce the effectiveness of oral contraceptives. Please consider other forms of contraception for 1 month following your procedure if you are currently using oral contraceptives as your primary form of birth control. In addition to this, we recommend continuing your oral contraceptive as prescribed, unless otherwise instructed by your physician, during this time. Other instructions  · Urinate through the strainer the doctor gives you. Save any stone pieces, including those that look like sand or gravel. Take these to your doctor. This will help your doctor find the cause of your stones. Follow-up care is a key part of your treatment and safety. Be sure to make and go to all appointments, and call your doctor if you are having problems. It's also a good idea to know your test results and keep a list of the medicines you take. When should you call for help? Call your doctor now or seek immediate medical care if:  · You have severe pain that does not get better after you take pain medicine. · You have severe pain when you urinate. · You have a fever over 100°F.  · You are not able to urinate within 6 to 8 hours after the procedure. · Your urine is still bright red 48 hours after the procedure. Watch closely for any changes in your health, and be sure to contact your doctor if:  You do not get better as expected. After general anesthesia or intravenous sedation, for 24 hours or while taking prescription Narcotics:  · Limit your activities. · A responsible adult needs to be with you for the next 24 hours. · Do not drive and operate hazardous machinery. · Do not make important personal or business decisions.   · Do not drink alcoholic beverages. · If you have not urinated within 8 hours after discharge, and you are experiencing discomfort from urinary retention, please go to the nearest Emergency Dept. · If you have sleep apnea and have a CPAP machine, please use it for all naps and sleeping. · Please use caution when taking narcotics and any of your home medications that may cause drowsiness. *  Please give a list of your current medications to your Primary Care Provider. *  Please update this list whenever your medications are discontinued, doses are      changed, or new medications (including over-the-counter products) are added. *  Please carry medication information at all times in case of emergency situations. These are general instructions for a healthy lifestyle:  No smoking/ No tobacco products/ Avoid exposure to second hand smoke  Surgeon General's Warning:  Quitting smoking now greatly reduces serious risk to your health. Obesity, smoking, and sedentary lifestyle greatly increases your risk for illness  A healthy diet, regular physical exercise & weight monitoring are important for maintaining a healthy lifestyle    You may be retaining fluid if you have a history of heart failure or if you experience any of the following symptoms:  Weight gain of 3 pounds or more overnight or 5 pounds in a week, increased swelling in our hands or feet or shortness of breath while lying flat in bed. Please call your doctor as soon as you notice any of these symptoms; do not wait until your next office visit.

## 2022-04-06 NOTE — ANESTHESIA POSTPROCEDURE EVALUATION
Procedure(s):  LITHOTRIPSY EXTRACORPOREAL SHOCKWAVE ESWL RIGHT. general    Anesthesia Post Evaluation      Multimodal analgesia: multimodal analgesia used between 6 hours prior to anesthesia start to PACU discharge  Patient location during evaluation: PACU  Patient participation: complete - patient participated  Level of consciousness: awake and alert  Pain score: 2  Pain management: satisfactory to patient  Airway patency: patent  Anesthetic complications: no  Cardiovascular status: acceptable and hemodynamically stable  Respiratory status: acceptable and spontaneous ventilation  Hydration status: acceptable  Post anesthesia nausea and vomiting:  none  Final Post Anesthesia Temperature Assessment:  Normothermia (36.0-37.5 degrees C)      INITIAL Post-op Vital signs:   Vitals Value Taken Time   /76 04/06/22 1811   Temp 36.6 °C (97.8 °F) 04/06/22 1747   Pulse 77 04/06/22 1812   Resp 20 04/06/22 1810   SpO2 91 % 04/06/22 1812   Vitals shown include unvalidated device data.

## 2022-04-06 NOTE — PERIOP NOTES
Preoperative flank skin condition: WNL  Lead shield: No - not of chid bearing age  Patient ear protection: Yes   Gel applied to patient's flank and Lithotripsy head: Yes   Starting power level: 7  Shock start time (first  fluoroscopy): 1703  Shock stop time (last lithotripsy shock): 1739  Ending power level: 11  Total shocks: 3500  Total fluoroscopy time: 3:46  Postoperative flank skin condition: Pink

## 2022-04-06 NOTE — BRIEF OP NOTE
Brief Postoperative Note    Patient: Claudette Arbour  YOB: 1955  MRN: [id number]    Date of Procedure: 4/6/2022     Pre-Op Diagnosis: Right Kidney stone [N20.0]    Post-Op Diagnosis: Same      Procedure(s):  LITHOTRIPSY EXTRACORPOREAL SHOCKWAVE ESWL RIGHT    Surgeon(s):  Solis Hastings MD    Surgical Assistant: None    Anesthesia: General     Estimated Blood Loss (mL): Minimal    Complications: None    Specimens: None    Drains: None    Electronically Signed by Cherry Gee MD on 4/6/2022 at 5:47 PM

## 2022-04-08 NOTE — OP NOTES
[facility]  OPERATIVE REPORT    Name:  Diana Rodriguez  MR#:  [id number]  :  1955  ACCOUNT #:  [de-identified]  DATE OF SERVICE:  2022    PREOPERATIVE DIAGNOSIS:  Right ureteropelvic junction stone. POSTOPERATIVE DIAGNOSIS:  Right ureteropelvic junction stone. PROCEDURE PERFORMED:  ESWL. SURGEON:  Greg Ray MD    ASSISTANT:  None. ANESTHESIA:  General.    COMPLICATIONS:  None    SPECIMENS REMOVED:  None. ESTIMATED BLOOD LOSS:  Minimal.    NUMBER OF SHOCKS:  3500    MAXIMAL POWER LEVEL:  11    DRAINS:  None. Postoperative fluoroscopic images would indicate excellent fragmentation of stone.       Concepción Boston MD      [id number]  D:  2022 8:57  T:  2022 14:52  JOB #:  [id number]

## 2022-07-25 NOTE — PERIOP NOTES
Discharge instructions reviewed with patient and spouse, Felix Lisa. Both verbalized understanding. Questions answered. No concerns. Papers with him. Prescriptions sent electronically to preferred pharmacy. Unable to use e-signature pad d/t malfunction. Siliq Pregnancy And Lactation Text: The risk during pregnancy and breastfeeding is uncertain with this medication.

## 2022-07-26 ENCOUNTER — OFFICE VISIT (OUTPATIENT)
Dept: FAMILY MEDICINE CLINIC | Facility: CLINIC | Age: 67
End: 2022-07-26
Payer: MEDICARE

## 2022-07-26 VITALS
SYSTOLIC BLOOD PRESSURE: 138 MMHG | BODY MASS INDEX: 18.27 KG/M2 | HEIGHT: 64 IN | DIASTOLIC BLOOD PRESSURE: 74 MMHG | WEIGHT: 107 LBS | HEART RATE: 80 BPM | TEMPERATURE: 98.3 F | OXYGEN SATURATION: 92 %

## 2022-07-26 DIAGNOSIS — K21.9 GASTROESOPHAGEAL REFLUX DISEASE, UNSPECIFIED WHETHER ESOPHAGITIS PRESENT: ICD-10-CM

## 2022-07-26 DIAGNOSIS — I67.1 CEREBRAL ANEURYSM, NONRUPTURED: Primary | ICD-10-CM

## 2022-07-26 PROCEDURE — 3017F COLORECTAL CA SCREEN DOC REV: CPT | Performed by: FAMILY MEDICINE

## 2022-07-26 PROCEDURE — 4004F PT TOBACCO SCREEN RCVD TLK: CPT | Performed by: FAMILY MEDICINE

## 2022-07-26 PROCEDURE — 1090F PRES/ABSN URINE INCON ASSESS: CPT | Performed by: FAMILY MEDICINE

## 2022-07-26 PROCEDURE — G8419 CALC BMI OUT NRM PARAM NOF/U: HCPCS | Performed by: FAMILY MEDICINE

## 2022-07-26 PROCEDURE — G8399 PT W/DXA RESULTS DOCUMENT: HCPCS | Performed by: FAMILY MEDICINE

## 2022-07-26 PROCEDURE — 99214 OFFICE O/P EST MOD 30 MIN: CPT | Performed by: FAMILY MEDICINE

## 2022-07-26 PROCEDURE — G8427 DOCREV CUR MEDS BY ELIG CLIN: HCPCS | Performed by: FAMILY MEDICINE

## 2022-07-26 PROCEDURE — 1123F ACP DISCUSS/DSCN MKR DOCD: CPT | Performed by: FAMILY MEDICINE

## 2022-07-26 RX ORDER — FAMOTIDINE 20 MG/1
20 TABLET, FILM COATED ORAL 2 TIMES DAILY
Qty: 60 TABLET | Refills: 3 | Status: SHIPPED | OUTPATIENT
Start: 2022-07-26

## 2022-07-26 ASSESSMENT — PATIENT HEALTH QUESTIONNAIRE - PHQ9
SUM OF ALL RESPONSES TO PHQ QUESTIONS 1-9: 0
SUM OF ALL RESPONSES TO PHQ9 QUESTIONS 1 & 2: 0
SUM OF ALL RESPONSES TO PHQ QUESTIONS 1-9: 0
SUM OF ALL RESPONSES TO PHQ QUESTIONS 1-9: 0
2. FEELING DOWN, DEPRESSED OR HOPELESS: 0
1. LITTLE INTEREST OR PLEASURE IN DOING THINGS: 0
SUM OF ALL RESPONSES TO PHQ QUESTIONS 1-9: 0

## 2022-07-26 ASSESSMENT — ENCOUNTER SYMPTOMS
NAUSEA: 1
RESPIRATORY NEGATIVE: 1
ALLERGIC/IMMUNOLOGIC NEGATIVE: 1
EYES NEGATIVE: 1

## 2022-07-26 NOTE — PROGRESS NOTES
Matt Geller (: 1955) is a 74 y.o. female, established patient, here for evaluation of the following chief complaint(s):  Heartburn (Has been having heartburn for the past few days. Wants to know what she take. Unable to burp. /) and Follow-up Chronic Condition (Mammogram and Dexa)       ASSESSMENT/PLAN:  1. Cerebral aneurysm, nonruptured  -     Melody Solis MD, Neurosurgery, [place]  2. Gastroesophageal reflux disease, unspecified whether esophagitis present  Reflux worsened over past few days, occurs after eating or drinking, more burping, no chest pain/pain down arm, will advise pepcid 20mg po bid as needed, if symptoms do not improve in a few weeks, call offie  Needs f/u for aneuryism, will refer to neurosurgery, previous neurosurgeon Dr. Kailey Grant moved to [place] a year or so ago. SUBJECTIVE/OBJECTIVE:  HPI  Reflux worsened over past few days, not taking anything for it, occurs when laying down or after eating/drinking. Review of Systems   Constitutional: Negative. HENT: Negative. Eyes: Negative. Respiratory: Negative. Cardiovascular: Negative. Gastrointestinal:  Positive for nausea. Endocrine: Negative. Genitourinary: Negative. Musculoskeletal: Negative. Skin: Negative. Allergic/Immunologic: Negative. Neurological: Negative. Psychiatric/Behavioral: Negative. All other systems reviewed and are negative. Physical Exam  Vitals and nursing note reviewed. Constitutional:       General: She is not in acute distress. Appearance: Normal appearance. She is not ill-appearing. HENT:      Head: Normocephalic and atraumatic. Right Ear: External ear normal.      Left Ear: External ear normal.      Nose: Nose normal.      Mouth/Throat:      Mouth: Mucous membranes are dry. Eyes:      Extraocular Movements: Extraocular movements intact. Pupils: Pupils are equal, round, and reactive to light.    Cardiovascular:      Rate and Rhythm: Normal rate. Pulses: Normal pulses. Pulmonary:      Effort: Pulmonary effort is normal.      Breath sounds: Normal breath sounds. Abdominal:      General: There is no distension. Musculoskeletal:         General: Normal range of motion. Cervical back: Normal range of motion and neck supple. Skin:     General: Skin is warm and dry. Neurological:      General: No focal deficit present. Mental Status: She is alert and oriented to person, place, and time. Psychiatric:         Mood and Affect: Mood normal.         On this date 07/26/22  I have spent 30 minutes reviewing previous notes, test results and face to face with the patient discussing the diagnosis and importance of compliance with the treatment plan as well as documenting on the day of the visit. An electronic signature was used to authenticate this note.   -- Margaret Prasad MD

## 2022-09-19 RX ORDER — ALENDRONATE SODIUM 70 MG/1
TABLET ORAL
Qty: 12 TABLET | Refills: 3 | Status: SHIPPED | OUTPATIENT
Start: 2022-09-19

## 2022-11-29 RX ORDER — GABAPENTIN 600 MG/1
TABLET ORAL
Qty: 270 TABLET | Refills: 3 | Status: SHIPPED | OUTPATIENT
Start: 2022-11-29 | End: 2023-05-28

## 2022-11-29 RX ORDER — ATORVASTATIN CALCIUM 20 MG/1
TABLET, FILM COATED ORAL
Qty: 90 TABLET | Refills: 3 | Status: SHIPPED | OUTPATIENT
Start: 2022-11-29

## 2023-02-14 ENCOUNTER — OFFICE VISIT (OUTPATIENT)
Dept: FAMILY MEDICINE CLINIC | Facility: CLINIC | Age: 68
End: 2023-02-14
Payer: MEDICARE

## 2023-02-14 VITALS
HEIGHT: 64 IN | HEART RATE: 81 BPM | TEMPERATURE: 98.2 F | WEIGHT: 105 LBS | DIASTOLIC BLOOD PRESSURE: 80 MMHG | SYSTOLIC BLOOD PRESSURE: 122 MMHG | OXYGEN SATURATION: 95 % | BODY MASS INDEX: 17.93 KG/M2

## 2023-02-14 DIAGNOSIS — M85.80 OSTEOPENIA, UNSPECIFIED LOCATION: Primary | ICD-10-CM

## 2023-02-14 DIAGNOSIS — Z09 ENCOUNTER FOR FOLLOW-UP EXAMINATION AFTER COMPLETED TREATMENT FOR CONDITIONS OTHER THAN MALIGNANT NEOPLASM: ICD-10-CM

## 2023-02-14 DIAGNOSIS — I69.359 HEMIPLEGIA AND HEMIPARESIS FOLLOWING CEREBRAL INFARCTION AFFECTING UNSPECIFIED SIDE (HCC): ICD-10-CM

## 2023-02-14 DIAGNOSIS — J43.2 CENTRILOBULAR EMPHYSEMA (HCC): ICD-10-CM

## 2023-02-14 DIAGNOSIS — Z12.31 SCREENING MAMMOGRAM FOR BREAST CANCER: ICD-10-CM

## 2023-02-14 PROCEDURE — G8419 CALC BMI OUT NRM PARAM NOF/U: HCPCS | Performed by: FAMILY MEDICINE

## 2023-02-14 PROCEDURE — 99214 OFFICE O/P EST MOD 30 MIN: CPT | Performed by: FAMILY MEDICINE

## 2023-02-14 PROCEDURE — 1123F ACP DISCUSS/DSCN MKR DOCD: CPT | Performed by: FAMILY MEDICINE

## 2023-02-14 PROCEDURE — 4004F PT TOBACCO SCREEN RCVD TLK: CPT | Performed by: FAMILY MEDICINE

## 2023-02-14 PROCEDURE — G8427 DOCREV CUR MEDS BY ELIG CLIN: HCPCS | Performed by: FAMILY MEDICINE

## 2023-02-14 PROCEDURE — 3023F SPIROM DOC REV: CPT | Performed by: FAMILY MEDICINE

## 2023-02-14 PROCEDURE — G8399 PT W/DXA RESULTS DOCUMENT: HCPCS | Performed by: FAMILY MEDICINE

## 2023-02-14 PROCEDURE — 1090F PRES/ABSN URINE INCON ASSESS: CPT | Performed by: FAMILY MEDICINE

## 2023-02-14 PROCEDURE — G8484 FLU IMMUNIZE NO ADMIN: HCPCS | Performed by: FAMILY MEDICINE

## 2023-02-14 PROCEDURE — 3017F COLORECTAL CA SCREEN DOC REV: CPT | Performed by: FAMILY MEDICINE

## 2023-02-14 RX ORDER — CLONAZEPAM 0.5 MG/1
0.5 TABLET ORAL
COMMUNITY
Start: 2023-02-07

## 2023-02-14 SDOH — ECONOMIC STABILITY: FOOD INSECURITY: WITHIN THE PAST 12 MONTHS, THE FOOD YOU BOUGHT JUST DIDN'T LAST AND YOU DIDN'T HAVE MONEY TO GET MORE.: NEVER TRUE

## 2023-02-14 SDOH — ECONOMIC STABILITY: FOOD INSECURITY: WITHIN THE PAST 12 MONTHS, YOU WORRIED THAT YOUR FOOD WOULD RUN OUT BEFORE YOU GOT MONEY TO BUY MORE.: NEVER TRUE

## 2023-02-14 SDOH — ECONOMIC STABILITY: INCOME INSECURITY: HOW HARD IS IT FOR YOU TO PAY FOR THE VERY BASICS LIKE FOOD, HOUSING, MEDICAL CARE, AND HEATING?: NOT HARD AT ALL

## 2023-02-14 ASSESSMENT — PATIENT HEALTH QUESTIONNAIRE - PHQ9
SUM OF ALL RESPONSES TO PHQ QUESTIONS 1-9: 0
2. FEELING DOWN, DEPRESSED OR HOPELESS: 0
SUM OF ALL RESPONSES TO PHQ QUESTIONS 1-9: 0
SUM OF ALL RESPONSES TO PHQ QUESTIONS 1-9: 0
SUM OF ALL RESPONSES TO PHQ9 QUESTIONS 1 & 2: 0
1. LITTLE INTEREST OR PLEASURE IN DOING THINGS: 0
SUM OF ALL RESPONSES TO PHQ QUESTIONS 1-9: 0

## 2023-02-14 NOTE — PROGRESS NOTES
Salazar Aguilar (: 1955) is a 74 y.o. female, established patient, here for evaluation of the following chief complaint(s):  Follow-up Chronic Condition       ASSESSMENT/PLAN:  1. Osteopenia, unspecified location  -     DEXA BONE DENSITY AXIAL SKELETON; Future  2. Hemiplegia and hemiparesis following cerebral infarction affecting unspecified side ([address]  3. Centrilobular emphysema ([address]  4. Screening mammogram for breast cancer  -     ROLF DIGITAL SCREEN W OR WO CAD BILATERAL; Future  5. Encounter for follow-up examination after completed treatment for conditions other than malignant neoplasm   -     DEXA BONE DENSITY AXIAL SKELETON; Future  History of aneurysm, had telemedicine visit with neurosurg, didn't get call regarding angiogram to get done,   To call neurosurgery to schedule  Bp normotensive on recheck  Stop alendronate, been on for more than 10 years. SUBJECTIVE/OBJECTIVE:  HPI  Hypertension - stable, well controlled, takes medications as prescribed. denies chest pain, shortness of breath, abdominal pain, nausea, vomiting, diarrhea, dizziness or fainting, headaches or vision changes. History of aneurysm, awaiting further f/u with neurology/neurosurgery    Physical Exam  Vitals and nursing note reviewed. Constitutional:       General: She is not in acute distress. Appearance: Normal appearance. She is not ill-appearing. HENT:      Head: Normocephalic and atraumatic. Right Ear: External ear normal.      Left Ear: External ear normal.      Nose: Nose normal.      Mouth/Throat:      Mouth: Mucous membranes are dry. Eyes:      Extraocular Movements: Extraocular movements intact. Pupils: Pupils are equal, round, and reactive to light. Cardiovascular:      Rate and Rhythm: Normal rate. Pulses: Normal pulses. Pulmonary:      Effort: Pulmonary effort is normal.      Breath sounds: Normal breath sounds. Abdominal:      General: There is no distension.    Musculoskeletal: General: Normal range of motion. Cervical back: Normal range of motion and neck supple. Skin:     General: Skin is warm and dry. Neurological:      General: No focal deficit present. Mental Status: She is alert and oriented to person, place, and time. Psychiatric:         Mood and Affect: Mood normal.       On this date 02/14/23  I have spent 30 minutes reviewing previous notes, test results and face to face with the patient discussing the diagnosis and importance of compliance with the treatment plan as well as documenting on the day of the visit. An electronic signature was used to authenticate this note.   -- Jer Reveles MD

## 2023-04-05 ENCOUNTER — TELEPHONE (OUTPATIENT)
Dept: FAMILY MEDICINE CLINIC | Facility: CLINIC | Age: 68
End: 2023-04-05

## 2023-05-17 ENCOUNTER — HOSPITAL ENCOUNTER (OUTPATIENT)
Dept: MAMMOGRAPHY | Age: 68
Discharge: HOME | End: 2023-05-20
Payer: MEDICARE

## 2023-05-17 DIAGNOSIS — Z09 ENCOUNTER FOR FOLLOW-UP EXAMINATION AFTER COMPLETED TREATMENT FOR CONDITIONS OTHER THAN MALIGNANT NEOPLASM: ICD-10-CM

## 2023-05-17 DIAGNOSIS — M85.80 OSTEOPENIA, UNSPECIFIED LOCATION: ICD-10-CM

## 2023-05-17 DIAGNOSIS — Z12.31 SCREENING MAMMOGRAM FOR BREAST CANCER: ICD-10-CM

## 2023-05-17 PROCEDURE — 77067 SCR MAMMO BI INCL CAD: CPT

## 2023-05-17 PROCEDURE — 77080 DXA BONE DENSITY AXIAL: CPT

## 2023-07-21 ENCOUNTER — TELEPHONE (OUTPATIENT)
Dept: FAMILY MEDICINE CLINIC | Facility: CLINIC | Age: 68
End: 2023-07-21

## 2023-07-21 NOTE — TELEPHONE ENCOUNTER
I dont have records of any procedure, please have her get records from the physician who did this so we can review, and get name of the physician. And schedule her a visit to discuss.

## 2023-07-21 NOTE — TELEPHONE ENCOUNTER
Patient states that she had a procedure recently done 7/19/23 where they implanted Celtacd vascular closure device. Vasorum . She states that the provider did not discuss this with her or her . States that she does not know what this is. She has trying to call the provider that did the procedure and they never called her back. She is inquiring what this is and why did they implant this in her.

## 2023-07-24 NOTE — TELEPHONE ENCOUNTER
Dr. Teodoro Moore. Is the name of the provider who did the procedure. He is with [facility]. She had this done on 8/19/23. Patient was given the records number for [facility].

## 2023-07-26 NOTE — TELEPHONE ENCOUNTER
Patient was called inquired if she spoke to [facility]. She  day. stated that she has not and will call them.

## 2023-07-28 NOTE — TELEPHONE ENCOUNTER
Patient called stated that the information was faxed yesterday. The MA checked with the  (AT) who stated she did not go through the fax.

## 2023-08-21 ENCOUNTER — OFFICE VISIT (OUTPATIENT)
Dept: FAMILY MEDICINE CLINIC | Facility: CLINIC | Age: 68
End: 2023-08-21
Payer: MEDICARE

## 2023-08-21 VITALS
HEIGHT: 64 IN | BODY MASS INDEX: 18.18 KG/M2 | OXYGEN SATURATION: 95 % | HEART RATE: 90 BPM | SYSTOLIC BLOOD PRESSURE: 120 MMHG | TEMPERATURE: 97.9 F | WEIGHT: 106.5 LBS | DIASTOLIC BLOOD PRESSURE: 80 MMHG

## 2023-08-21 DIAGNOSIS — R10.31 RIGHT INGUINAL PAIN: ICD-10-CM

## 2023-08-21 DIAGNOSIS — R79.9 ABNORMAL FINDING OF BLOOD CHEMISTRY, UNSPECIFIED: ICD-10-CM

## 2023-08-21 DIAGNOSIS — E78.00 PURE HYPERCHOLESTEROLEMIA, UNSPECIFIED: ICD-10-CM

## 2023-08-21 DIAGNOSIS — K21.9 GASTROESOPHAGEAL REFLUX DISEASE, UNSPECIFIED WHETHER ESOPHAGITIS PRESENT: ICD-10-CM

## 2023-08-21 DIAGNOSIS — Z12.11 SCREENING FOR COLON CANCER: ICD-10-CM

## 2023-08-21 DIAGNOSIS — M21.371 RIGHT FOOT DROP: ICD-10-CM

## 2023-08-21 DIAGNOSIS — Z00.00 MEDICARE ANNUAL WELLNESS VISIT, SUBSEQUENT: Primary | ICD-10-CM

## 2023-08-21 DIAGNOSIS — I69.359 HEMIPLEGIA AND HEMIPARESIS FOLLOWING CEREBRAL INFARCTION AFFECTING UNSPECIFIED SIDE (HCC): ICD-10-CM

## 2023-08-21 LAB
ALBUMIN SERPL-MCNC: 3.7 G/DL (ref 3.2–4.6)
ALBUMIN/GLOB SERPL: 1.1 (ref 0.4–1.6)
ALP SERPL-CCNC: 100 U/L (ref 50–136)
ALT SERPL-CCNC: 22 U/L (ref 12–65)
ANION GAP SERPL CALC-SCNC: 6 MMOL/L (ref 2–11)
AST SERPL-CCNC: 15 U/L (ref 15–37)
BASOPHILS # BLD: 0.1 K/UL (ref 0–0.2)
BASOPHILS NFR BLD: 1 % (ref 0–2)
BILIRUB SERPL-MCNC: 0.3 MG/DL (ref 0.2–1.1)
BUN SERPL-MCNC: 7 MG/DL (ref 8–23)
CALCIUM SERPL-MCNC: 9.3 MG/DL (ref 8.3–10.4)
CHLORIDE SERPL-SCNC: 111 MMOL/L (ref 101–110)
CHOLEST SERPL-MCNC: 143 MG/DL
CO2 SERPL-SCNC: 28 MMOL/L (ref 21–32)
CREAT SERPL-MCNC: 0.9 MG/DL (ref 0.6–1)
DIFFERENTIAL METHOD BLD: ABNORMAL
EOSINOPHIL # BLD: 0.1 K/UL (ref 0–0.8)
EOSINOPHIL NFR BLD: 1 % (ref 0.5–7.8)
ERYTHROCYTE [DISTWIDTH] IN BLOOD BY AUTOMATED COUNT: 13.2 % (ref 11.9–14.6)
EST. AVERAGE GLUCOSE BLD GHB EST-MCNC: 126 MG/DL
GLOBULIN SER CALC-MCNC: 3.5 G/DL (ref 2.8–4.5)
GLUCOSE SERPL-MCNC: 108 MG/DL (ref 65–100)
HBA1C MFR BLD: 6 % (ref 4.8–5.6)
HCT VFR BLD AUTO: 41.3 % (ref 35.8–46.3)
HDLC SERPL-MCNC: 42 MG/DL (ref 40–60)
HDLC SERPL: 3.4
HGB BLD-MCNC: 13.1 G/DL (ref 11.7–15.4)
IMM GRANULOCYTES # BLD AUTO: 0.1 K/UL (ref 0–0.5)
IMM GRANULOCYTES NFR BLD AUTO: 1 % (ref 0–5)
LDLC SERPL CALC-MCNC: 57.4 MG/DL
LYMPHOCYTES # BLD: 3.3 K/UL (ref 0.5–4.6)
LYMPHOCYTES NFR BLD: 30 % (ref 13–44)
MCH RBC QN AUTO: 32 PG (ref 26.1–32.9)
MCHC RBC AUTO-ENTMCNC: 31.7 G/DL (ref 31.4–35)
MCV RBC AUTO: 101 FL (ref 82–102)
MONOCYTES # BLD: 0.7 K/UL (ref 0.1–1.3)
MONOCYTES NFR BLD: 7 % (ref 4–12)
NEUTS SEG # BLD: 6.6 K/UL (ref 1.7–8.2)
NEUTS SEG NFR BLD: 60 % (ref 43–78)
NRBC # BLD: 0 K/UL (ref 0–0.2)
PLATELET # BLD AUTO: 197 K/UL (ref 150–450)
PMV BLD AUTO: 12.6 FL (ref 9.4–12.3)
POTASSIUM SERPL-SCNC: 4.1 MMOL/L (ref 3.5–5.1)
PROT SERPL-MCNC: 7.2 G/DL (ref 6.3–8.2)
RBC # BLD AUTO: 4.09 M/UL (ref 4.05–5.2)
SODIUM SERPL-SCNC: 145 MMOL/L (ref 133–143)
TRIGL SERPL-MCNC: 218 MG/DL (ref 35–150)
TSH, 3RD GENERATION: 0.51 UIU/ML (ref 0.36–3.74)
VLDLC SERPL CALC-MCNC: 43.6 MG/DL (ref 6–23)
WBC # BLD AUTO: 10.8 K/UL (ref 4.3–11.1)

## 2023-08-21 PROCEDURE — G8399 PT W/DXA RESULTS DOCUMENT: HCPCS | Performed by: FAMILY MEDICINE

## 2023-08-21 PROCEDURE — G8427 DOCREV CUR MEDS BY ELIG CLIN: HCPCS | Performed by: FAMILY MEDICINE

## 2023-08-21 PROCEDURE — G0439 PPPS, SUBSEQ VISIT: HCPCS | Performed by: FAMILY MEDICINE

## 2023-08-21 PROCEDURE — 1090F PRES/ABSN URINE INCON ASSESS: CPT | Performed by: FAMILY MEDICINE

## 2023-08-21 PROCEDURE — G8419 CALC BMI OUT NRM PARAM NOF/U: HCPCS | Performed by: FAMILY MEDICINE

## 2023-08-21 PROCEDURE — 3017F COLORECTAL CA SCREEN DOC REV: CPT | Performed by: FAMILY MEDICINE

## 2023-08-21 PROCEDURE — 4004F PT TOBACCO SCREEN RCVD TLK: CPT | Performed by: FAMILY MEDICINE

## 2023-08-21 PROCEDURE — 1123F ACP DISCUSS/DSCN MKR DOCD: CPT | Performed by: FAMILY MEDICINE

## 2023-08-21 PROCEDURE — 99214 OFFICE O/P EST MOD 30 MIN: CPT | Performed by: FAMILY MEDICINE

## 2023-08-21 RX ORDER — ATORVASTATIN CALCIUM 20 MG/1
TABLET, FILM COATED ORAL
Qty: 90 TABLET | Refills: 3 | Status: SHIPPED | OUTPATIENT
Start: 2023-08-21

## 2023-08-21 RX ORDER — GABAPENTIN 600 MG/1
600 TABLET ORAL 3 TIMES DAILY
Qty: 270 TABLET | Refills: 3 | Status: SHIPPED | OUTPATIENT
Start: 2023-08-21 | End: 2024-02-17

## 2023-08-21 SDOH — ECONOMIC STABILITY: FOOD INSECURITY: WITHIN THE PAST 12 MONTHS, THE FOOD YOU BOUGHT JUST DIDN'T LAST AND YOU DIDN'T HAVE MONEY TO GET MORE.: PATIENT DECLINED

## 2023-08-21 SDOH — ECONOMIC STABILITY: INCOME INSECURITY: HOW HARD IS IT FOR YOU TO PAY FOR THE VERY BASICS LIKE FOOD, HOUSING, MEDICAL CARE, AND HEATING?: PATIENT DECLINED

## 2023-08-21 SDOH — ECONOMIC STABILITY: HOUSING INSECURITY
IN THE LAST 12 MONTHS, WAS THERE A TIME WHEN YOU DID NOT HAVE A STEADY PLACE TO SLEEP OR SLEPT IN A SHELTER (INCLUDING NOW)?: PATIENT REFUSED

## 2023-08-21 SDOH — ECONOMIC STABILITY: FOOD INSECURITY: WITHIN THE PAST 12 MONTHS, YOU WORRIED THAT YOUR FOOD WOULD RUN OUT BEFORE YOU GOT MONEY TO BUY MORE.: PATIENT DECLINED

## 2023-08-21 ASSESSMENT — LIFESTYLE VARIABLES
HOW MANY STANDARD DRINKS CONTAINING ALCOHOL DO YOU HAVE ON A TYPICAL DAY: PATIENT DOES NOT DRINK
HOW OFTEN DO YOU HAVE A DRINK CONTAINING ALCOHOL: NEVER

## 2023-08-21 ASSESSMENT — PATIENT HEALTH QUESTIONNAIRE - PHQ9
SUM OF ALL RESPONSES TO PHQ QUESTIONS 1-9: 0
SUM OF ALL RESPONSES TO PHQ QUESTIONS 1-9: 0
2. FEELING DOWN, DEPRESSED OR HOPELESS: 0
1. LITTLE INTEREST OR PLEASURE IN DOING THINGS: 0
SUM OF ALL RESPONSES TO PHQ QUESTIONS 1-9: 0
SUM OF ALL RESPONSES TO PHQ9 QUESTIONS 1 & 2: 0
SUM OF ALL RESPONSES TO PHQ QUESTIONS 1-9: 0

## 2023-08-21 NOTE — PROGRESS NOTES
Lita Beckman (: 1955) is a 79 y.o. female, established patient, here for evaluation of the following chief complaint(s):  Medicare AWV and Other       ASSESSMENT/PLAN:  1. Medicare annual wellness visit, subsequent  -     Hemoglobin A1C; Future  -     CBC with Auto Differential; Future  -     Comprehensive Metabolic Panel; Future  -     Lipid Panel; Future  -     TSH; Future  2. Right inguinal pain  -     Vascular duplex lower extremity arteries bilateral with KAE; Future  -     Hemoglobin A1C; Future  -     CBC with Auto Differential; Future  -     Comprehensive Metabolic Panel; Future  -     Lipid Panel; Future  -     TSH; Future  3. Hemiplegia and hemiparesis following cerebral infarction affecting unspecified side (HCC)  -     Hemoglobin A1C; Future  -     CBC with Auto Differential; Future  -     Comprehensive Metabolic Panel; Future  -     Lipid Panel; Future  -     TSH; Future  -     External Referral to Physical Therapy  4. Gastroesophageal reflux disease, unspecified whether esophagitis present  -     Hemoglobin A1C; Future  -     CBC with Auto Differential; Future  -     Comprehensive Metabolic Panel; Future  -     Lipid Panel; Future  -     TSH; Future  5. Pure hypercholesterolemia, unspecified  -     Hemoglobin A1C; Future  -     CBC with Auto Differential; Future  -     Comprehensive Metabolic Panel; Future  -     Lipid Panel; Future  -     TSH; Future  6. Abnormal finding of blood chemistry, unspecified  -     Hemoglobin A1C; Future  7. Screening for colon cancer  -     POCT FECAL IMMUNOCHEMICAL TEST (FIT); Future  8. Right foot drop  -     [facility] - Brendan Bustillos DPM, [facility]      Return for [facility] Visit in 1 year.     Medicare wellness visit completed  Refill chronic medications  Check labs today  See dentist every 6 months  See eye physician or get eyes checked every 1-2 years  Immunizations reviewed and discussed with patient  Patient

## 2023-08-30 ENCOUNTER — TELEPHONE (OUTPATIENT)
Dept: FAMILY MEDICINE CLINIC | Facility: CLINIC | Age: 68
End: 2023-08-30
Payer: MEDICARE

## 2023-08-30 ENCOUNTER — HOSPITAL ENCOUNTER (OUTPATIENT)
Dept: ULTRASOUND IMAGING | Age: 68
Discharge: HOME | End: 2023-09-02
Attending: FAMILY MEDICINE
Payer: MEDICARE

## 2023-08-30 DIAGNOSIS — Z12.11 SCREENING FOR COLON CANCER: ICD-10-CM

## 2023-08-30 DIAGNOSIS — R10.31 RIGHT INGUINAL PAIN: ICD-10-CM

## 2023-08-30 LAB
VAS LEFT ABI: 1.04
VAS LEFT ARM BP: 164 MMHG
VAS LEFT DORSALIS PEDIS BP: 170 MMHG
VAS LEFT PTA BP: 137 MMHG
VAS LEFT TBI: 0.73
VAS LEFT TOE PRESSURE: 119 MMHG
VAS RIGHT ABI: 0.99
VAS RIGHT ARM BP: 159 MMHG
VAS RIGHT DORSALIS PEDIS BP: 155 MMHG
VAS RIGHT PTA BP: 162 MMHG
VAS RIGHT TBI: 0.63
VAS RIGHT TOE PRESSURE: 104 MMHG

## 2023-08-30 PROCEDURE — 82274 ASSAY TEST FOR BLOOD FECAL: CPT | Performed by: FAMILY MEDICINE

## 2023-08-30 PROCEDURE — 93922 UPR/L XTREMITY ART 2 LEVELS: CPT

## 2023-08-31 LAB
HEMOCCULT STL QL: NEGATIVE
VALID INTERNAL CONTROL: YES

## 2023-08-31 NOTE — TELEPHONE ENCOUNTER
The patient brought in their sample for the fecal occult test.     The results of the fecal occult test was negative. Results were placed in the chart and sent to the provider. Patient notified that results were negative.

## 2023-10-04 RX ORDER — ALENDRONATE SODIUM 70 MG/1
TABLET ORAL
Qty: 12 TABLET | OUTPATIENT
Start: 2023-10-04

## 2024-03-25 ENCOUNTER — TELEPHONE (OUTPATIENT)
Dept: FAMILY MEDICINE CLINIC | Facility: CLINIC | Age: 69
End: 2024-03-25

## 2024-03-25 NOTE — TELEPHONE ENCOUNTER
----- Message from Juana Madsen-MOON Greenberg sent at 3/25/2024  2:07 PM EDT -----  Subject: Message to Provider    QUESTIONS  Information for Provider? Dx? Drop foot. Had Foot mold completed.   Requesting Rx for Custom Rt foot orthotics. Please fax order to [facility]   [phone]. Please call Patient when complete.   ---------------------------------------------------------------------------  --------------  CALL BACK INFO  [phone]; OK to leave message on voicemail  ---------------------------------------------------------------------------  --------------  SCRIPT ANSWERS  Relationship to Patient? Self

## 2024-03-26 NOTE — TELEPHONE ENCOUNTER
For medicare, equipment needs a face to face encounter for order to be covered (within past 60 days), please make her an appointment.

## 2024-04-15 ENCOUNTER — OFFICE VISIT (OUTPATIENT)
Dept: FAMILY MEDICINE CLINIC | Facility: CLINIC | Age: 69
End: 2024-04-15
Payer: MEDICARE

## 2024-04-15 VITALS
HEART RATE: 78 BPM | DIASTOLIC BLOOD PRESSURE: 52 MMHG | TEMPERATURE: 97.9 F | SYSTOLIC BLOOD PRESSURE: 108 MMHG | HEIGHT: 64 IN | WEIGHT: 108 LBS | OXYGEN SATURATION: 95 % | BODY MASS INDEX: 18.44 KG/M2

## 2024-04-15 DIAGNOSIS — M85.80 OSTEOPENIA, UNSPECIFIED LOCATION: ICD-10-CM

## 2024-04-15 DIAGNOSIS — Z13.1 ENCOUNTER FOR SCREENING FOR DIABETES MELLITUS: ICD-10-CM

## 2024-04-15 DIAGNOSIS — E55.9 VITAMIN D DEFICIENCY, UNSPECIFIED: ICD-10-CM

## 2024-04-15 DIAGNOSIS — J43.2 CENTRILOBULAR EMPHYSEMA (HCC): ICD-10-CM

## 2024-04-15 DIAGNOSIS — K21.9 GASTROESOPHAGEAL REFLUX DISEASE, UNSPECIFIED WHETHER ESOPHAGITIS PRESENT: ICD-10-CM

## 2024-04-15 DIAGNOSIS — M21.371 RIGHT FOOT DROP: ICD-10-CM

## 2024-04-15 DIAGNOSIS — I69.359 HEMIPLEGIA AND HEMIPARESIS FOLLOWING CEREBRAL INFARCTION AFFECTING UNSPECIFIED SIDE (HCC): ICD-10-CM

## 2024-04-15 DIAGNOSIS — Z00.00 MEDICARE ANNUAL WELLNESS VISIT, SUBSEQUENT: Primary | ICD-10-CM

## 2024-04-15 DIAGNOSIS — E78.00 PURE HYPERCHOLESTEROLEMIA, UNSPECIFIED: ICD-10-CM

## 2024-04-15 PROCEDURE — G8399 PT W/DXA RESULTS DOCUMENT: HCPCS | Performed by: FAMILY MEDICINE

## 2024-04-15 PROCEDURE — G8420 CALC BMI NORM PARAMETERS: HCPCS | Performed by: FAMILY MEDICINE

## 2024-04-15 PROCEDURE — 4004F PT TOBACCO SCREEN RCVD TLK: CPT | Performed by: FAMILY MEDICINE

## 2024-04-15 PROCEDURE — 3017F COLORECTAL CA SCREEN DOC REV: CPT | Performed by: FAMILY MEDICINE

## 2024-04-15 PROCEDURE — G8428 CUR MEDS NOT DOCUMENT: HCPCS | Performed by: FAMILY MEDICINE

## 2024-04-15 PROCEDURE — 3023F SPIROM DOC REV: CPT | Performed by: FAMILY MEDICINE

## 2024-04-15 PROCEDURE — 1090F PRES/ABSN URINE INCON ASSESS: CPT | Performed by: FAMILY MEDICINE

## 2024-04-15 PROCEDURE — 1123F ACP DISCUSS/DSCN MKR DOCD: CPT | Performed by: FAMILY MEDICINE

## 2024-04-15 PROCEDURE — 99213 OFFICE O/P EST LOW 20 MIN: CPT | Performed by: FAMILY MEDICINE

## 2024-04-15 RX ORDER — ATORVASTATIN CALCIUM 20 MG/1
TABLET, FILM COATED ORAL
Qty: 90 TABLET | Refills: 3 | Status: SHIPPED | OUTPATIENT
Start: 2024-04-15

## 2024-04-15 RX ORDER — GABAPENTIN 600 MG/1
600 TABLET ORAL 3 TIMES DAILY
Qty: 270 TABLET | Refills: 3 | Status: SHIPPED | OUTPATIENT
Start: 2024-04-15 | End: 2024-10-12

## 2024-04-15 ASSESSMENT — PATIENT HEALTH QUESTIONNAIRE - PHQ9
2. FEELING DOWN, DEPRESSED OR HOPELESS: NOT AT ALL
1. LITTLE INTEREST OR PLEASURE IN DOING THINGS: NOT AT ALL
SUM OF ALL RESPONSES TO PHQ QUESTIONS 1-9: 0
SUM OF ALL RESPONSES TO PHQ QUESTIONS 1-9: 0
SUM OF ALL RESPONSES TO PHQ9 QUESTIONS 1 & 2: 0
SUM OF ALL RESPONSES TO PHQ QUESTIONS 1-9: 0
SUM OF ALL RESPONSES TO PHQ QUESTIONS 1-9: 0

## 2024-04-15 NOTE — PROGRESS NOTES
Jolynn Martines (: 1955) is a 69 y.o. female, established patient, here for evaluation of the following chief complaint(s):  Follow-up Chronic Condition       ASSESSMENT/PLAN:  1. Medicare annual wellness visit, subsequent  -     CBC with Auto Differential; Future  -     Comprehensive Metabolic Panel; Future  -     Lipid Panel; Future  -     Hemoglobin A1C; Future  -     TSH; Future  -     Vitamin D 25 Hydroxy; Future  2. Centrilobular emphysema (HCC)  -     CBC with Auto Differential; Future  -     Comprehensive Metabolic Panel; Future  -     Lipid Panel; Future  -     Hemoglobin A1C; Future  -     TSH; Future  -     Vitamin D 25 Hydroxy; Future  3. Gastroesophageal reflux disease, unspecified whether esophagitis present  -     CBC with Auto Differential; Future  -     Comprehensive Metabolic Panel; Future  -     Lipid Panel; Future  -     Hemoglobin A1C; Future  -     TSH; Future  -     Vitamin D 25 Hydroxy; Future  4. Hemiplegia and hemiparesis following cerebral infarction affecting unspecified side (HCC)  -     CBC with Auto Differential; Future  -     Comprehensive Metabolic Panel; Future  -     Lipid Panel; Future  -     Hemoglobin A1C; Future  -     TSH; Future  -     Vitamin D 25 Hydroxy; Future  5. Right foot drop  -     CBC with Auto Differential; Future  -     Comprehensive Metabolic Panel; Future  -     Lipid Panel; Future  -     Hemoglobin A1C; Future  -     TSH; Future  -     Vitamin D 25 Hydroxy; Future  6. Osteopenia, unspecified location  -     CBC with Auto Differential; Future  -     Comprehensive Metabolic Panel; Future  -     Lipid Panel; Future  -     Hemoglobin A1C; Future  -     TSH; Future  -     Vitamin D 25 Hydroxy; Future  7. Pure hypercholesterolemia, unspecified  -     CBC with Auto Differential; Future  -     Comprehensive Metabolic Panel; Future  -     Lipid Panel; Future  -     Hemoglobin A1C; Future  -     TSH; Future  -     Vitamin D 25 Hydroxy; Future  8. Encounter

## 2024-04-16 ENCOUNTER — TELEPHONE (OUTPATIENT)
Dept: FAMILY MEDICINE CLINIC | Facility: CLINIC | Age: 69
End: 2024-04-16

## 2024-04-16 NOTE — TELEPHONE ENCOUNTER
Patient came for a visit on 4/15/24 and needed an order for a right foot orthotic.  The provider wrote out a script for the orthotic.     This is to be faxed to the [facility] at [phone] along with notes from 4/15/24.     This information was faxed to the above number.      Patient notified that the info was faxed to the [facility].

## 2024-08-12 ENCOUNTER — TELEPHONE (OUTPATIENT)
Dept: FAMILY MEDICINE CLINIC | Facility: CLINIC | Age: 69
End: 2024-08-12

## 2024-08-12 NOTE — TELEPHONE ENCOUNTER
----- Message from Oziel ASHRAF sent at 8/12/2024  3:08 PM EDT -----  Regarding: E[facility] Appointment Request  E[facility] Appointment Request    Patient needs appointment for E[facility] Appointment Type: Existing Condition Follow Up. For kidney stones.    Patient Requested Dates(s): Aug. 14  Patient Requested Time: any time   Provider Name:Mallory Portillo MD    Reason for Appointment Request: Established Patient - Available appointments did not meet patient need  --------------------------------------------------------------------------------------------------------------------------    Relationship to Patient: Self     Call Back Information: OK to leave message on voicemail  Preferred Call Back Number: Phone [phone]

## 2024-08-14 ENCOUNTER — OFFICE VISIT (OUTPATIENT)
Dept: FAMILY MEDICINE CLINIC | Facility: CLINIC | Age: 69
End: 2024-08-14
Payer: MEDICARE

## 2024-08-14 ENCOUNTER — HOSPITAL ENCOUNTER (OUTPATIENT)
Dept: CT IMAGING | Age: 69
Discharge: HOME | End: 2024-08-17
Payer: MEDICARE

## 2024-08-14 VITALS
HEART RATE: 88 BPM | HEIGHT: 64 IN | DIASTOLIC BLOOD PRESSURE: 86 MMHG | TEMPERATURE: 97.9 F | SYSTOLIC BLOOD PRESSURE: 130 MMHG | BODY MASS INDEX: 17.99 KG/M2 | OXYGEN SATURATION: 100 % | WEIGHT: 105.38 LBS

## 2024-08-14 DIAGNOSIS — R10.9 LEFT FLANK PAIN: ICD-10-CM

## 2024-08-14 DIAGNOSIS — R82.71 BACTERIA IN URINE: ICD-10-CM

## 2024-08-14 DIAGNOSIS — N20.0 RENAL CALCULI: Primary | ICD-10-CM

## 2024-08-14 DIAGNOSIS — R31.0 GROSS HEMATURIA: Primary | ICD-10-CM

## 2024-08-14 LAB
ALBUMIN SERPL-MCNC: 4.3 G/DL (ref 3.2–4.6)
ALBUMIN/GLOB SERPL: 1.3 (ref 1–1.9)
ALP SERPL-CCNC: 111 U/L (ref 35–104)
ALT SERPL-CCNC: 15 U/L (ref 12–65)
ANION GAP SERPL CALC-SCNC: 12 MMOL/L (ref 9–18)
AST SERPL-CCNC: 23 U/L (ref 15–37)
BASOPHILS # BLD: 0 K/UL (ref 0–0.2)
BASOPHILS NFR BLD: 0 % (ref 0–2)
BILIRUB SERPL-MCNC: 0.3 MG/DL (ref 0–1.2)
BILIRUBIN, URINE, POC: NEGATIVE
BLOOD URINE, POC: NORMAL
BUN SERPL-MCNC: 10 MG/DL (ref 8–23)
CALCIUM SERPL-MCNC: 9.6 MG/DL (ref 8.8–10.2)
CHLORIDE SERPL-SCNC: 105 MMOL/L (ref 98–107)
CO2 SERPL-SCNC: 25 MMOL/L (ref 20–28)
CREAT SERPL-MCNC: 0.81 MG/DL (ref 0.6–1.1)
DIFFERENTIAL METHOD BLD: ABNORMAL
EOSINOPHIL # BLD: 0.1 K/UL (ref 0–0.8)
EOSINOPHIL NFR BLD: 1 % (ref 0.5–7.8)
ERYTHROCYTE [DISTWIDTH] IN BLOOD BY AUTOMATED COUNT: 13.1 % (ref 11.9–14.6)
GLOBULIN SER CALC-MCNC: 3.3 G/DL (ref 2.3–3.5)
GLUCOSE SERPL-MCNC: 121 MG/DL (ref 70–99)
GLUCOSE URINE, POC: NEGATIVE
HCT VFR BLD AUTO: 44.8 % (ref 35.8–46.3)
HGB BLD-MCNC: 14.3 G/DL (ref 11.7–15.4)
IMM GRANULOCYTES # BLD AUTO: 0 K/UL (ref 0–0.5)
IMM GRANULOCYTES NFR BLD AUTO: 0 % (ref 0–5)
KETONES, URINE, POC: NEGATIVE
LEUKOCYTE ESTERASE, URINE, POC: NORMAL
LYMPHOCYTES # BLD: 2.1 K/UL (ref 0.5–4.6)
LYMPHOCYTES NFR BLD: 19 % (ref 13–44)
MCH RBC QN AUTO: 31.6 PG (ref 26.1–32.9)
MCHC RBC AUTO-ENTMCNC: 31.9 G/DL (ref 31.4–35)
MCV RBC AUTO: 99.1 FL (ref 82–102)
MONOCYTES # BLD: 0.8 K/UL (ref 0.1–1.3)
MONOCYTES NFR BLD: 8 % (ref 4–12)
NEUTS SEG # BLD: 7.9 K/UL (ref 1.7–8.2)
NEUTS SEG NFR BLD: 72 % (ref 43–78)
NITRITE, URINE, POC: NEGATIVE
NRBC # BLD: 0 K/UL (ref 0–0.2)
PH, URINE, POC: 6.5 (ref 4.6–8)
PLATELET # BLD AUTO: 211 K/UL (ref 150–450)
PMV BLD AUTO: 12.5 FL (ref 9.4–12.3)
POTASSIUM SERPL-SCNC: 4.7 MMOL/L (ref 3.5–5.1)
PROT SERPL-MCNC: 7.6 G/DL (ref 6.3–8.2)
PROTEIN,URINE, POC: NORMAL
RBC # BLD AUTO: 4.52 M/UL (ref 4.05–5.2)
SODIUM SERPL-SCNC: 141 MMOL/L (ref 136–145)
SPECIFIC GRAVITY, URINE, POC: 1.01 (ref 1–1.03)
URINALYSIS CLARITY, POC: NORMAL
URINALYSIS COLOR, POC: NORMAL
UROBILINOGEN, POC: NORMAL
WBC # BLD AUTO: 11 K/UL (ref 4.3–11.1)

## 2024-08-14 PROCEDURE — 3017F COLORECTAL CA SCREEN DOC REV: CPT

## 2024-08-14 PROCEDURE — 1123F ACP DISCUSS/DSCN MKR DOCD: CPT

## 2024-08-14 PROCEDURE — G8399 PT W/DXA RESULTS DOCUMENT: HCPCS

## 2024-08-14 PROCEDURE — 74176 CT ABD & PELVIS W/O CONTRAST: CPT

## 2024-08-14 PROCEDURE — G8427 DOCREV CUR MEDS BY ELIG CLIN: HCPCS

## 2024-08-14 PROCEDURE — 1090F PRES/ABSN URINE INCON ASSESS: CPT

## 2024-08-14 PROCEDURE — 81003 URINALYSIS AUTO W/O SCOPE: CPT

## 2024-08-14 PROCEDURE — 99214 OFFICE O/P EST MOD 30 MIN: CPT

## 2024-08-14 PROCEDURE — 4004F PT TOBACCO SCREEN RCVD TLK: CPT

## 2024-08-14 PROCEDURE — G8419 CALC BMI OUT NRM PARAM NOF/U: HCPCS

## 2024-08-14 RX ORDER — CIPROFLOXACIN 500 MG/1
500 TABLET, FILM COATED ORAL 2 TIMES DAILY
Qty: 6 TABLET | Refills: 0 | Status: SHIPPED | OUTPATIENT
Start: 2024-08-14 | End: 2024-08-17

## 2024-08-14 NOTE — RESULT ENCOUNTER NOTE
Please call and let patient know she has multiple, small stones on her right side. There is a 2-3 mm stone causing backflow of urine into her kidney and pelvic. I'd like to go ahead and refer her to Urology. Will place that order and they should be contacting her.

## 2024-08-14 NOTE — PROGRESS NOTES
Jolynn SERRA Stone (: 1955) is a 69 y.o. female, established patient, here for evaluation of the following chief complaint(s):  Nephrolithiasis (Blood in urine. Noticed Friday night. Blood volume has lessened )       ASSESSMENT/PLAN:  1. Gross hematuria  -     AMB POC URINALYSIS DIP STICK AUTO W/O MICRO  -     Comprehensive Metabolic Panel; Future  -     CBC with Auto Differential; Future  2. Left flank pain  -     Comprehensive Metabolic Panel; Future  -     CBC with Auto Differential; Future  -     CT ABDOMEN PELVIS WO CONTRAST Additional Contrast? Radiologist Recommendation; Future  -     Culture, Urine; Future  -     ciprofloxacin (CIPRO) 500 MG tablet; Take 1 tablet by mouth 2 times daily for 3 days, Disp-6 tablet, R-0Normal  3. Bacteria in urine  -     Culture, Urine; Future  -     ciprofloxacin (CIPRO) 500 MG tablet; Take 1 tablet by mouth 2 times daily for 3 days, Disp-6 tablet, R-0Normal    CT abdomen STAT ordered to assess for stone. CBC, CMP obtained.   Sending culture. Ordering Cipro x3 days given hx, UA results.   Advised to hydrate well with water, limit coffee intake significantly- max of 2-3 cups daily. Do not hold urine.     SUBJECTIVE/OBJECTIVE:  Nephrolithiasis        Blood in urine: noticed appx 4 days ago. Hx of kidney stones, had lithotripsy in  for 9 mm stone. She is endorsing left flank pain that radiates to LLQ for 4 days. Is unsure if she has been febrile, has been experiencing \"sweats\" intermittently. No N/V. She is urinating normally. She does endorse drinking at least 6 cups of coffee daily, has been holding her urine as well. Has been taking Antibacterial Plus Urinary Pain Relief (NSAID).    UA with small leuks. Given symptoms as well as holding urine, bladder irritants, will prescribe Cipro x3 days. Sending culture. Ordered CBC, CMP to assess. Ordered STAT CT to assess for kidney stone. We originally had an appt for patient at 12 today however she is unable to make this due

## 2024-08-15 ENCOUNTER — TELEPHONE (OUTPATIENT)
Dept: UROLOGY | Age: 69
End: 2024-08-15

## 2024-08-15 NOTE — TELEPHONE ENCOUNTER
Pt is established with [facility] until 4/21/25, per Tiffany abarca next avail OV spot and she will put her on a cancellation list, called to [facility] for patient to return call

## 2024-08-16 ENCOUNTER — HOSPITAL ENCOUNTER (OUTPATIENT)
Dept: GENERAL RADIOLOGY | Age: 69
End: 2024-08-16
Payer: MEDICARE

## 2024-08-16 ENCOUNTER — TELEPHONE (OUTPATIENT)
Dept: UROLOGY | Age: 69
End: 2024-08-16

## 2024-08-16 ENCOUNTER — OFFICE VISIT (OUTPATIENT)
Dept: UROLOGY | Age: 69
End: 2024-08-16
Payer: MEDICARE

## 2024-08-16 DIAGNOSIS — N20.0 KIDNEY STONE: ICD-10-CM

## 2024-08-16 DIAGNOSIS — N20.0 KIDNEY STONE: Primary | ICD-10-CM

## 2024-08-16 DIAGNOSIS — N20.1 RIGHT URETERAL STONE: Primary | ICD-10-CM

## 2024-08-16 LAB
BACTERIA SPEC CULT: NORMAL
BACTERIA SPEC CULT: NORMAL
BILIRUBIN, URINE, POC: NEGATIVE
BLOOD URINE, POC: NORMAL
GLUCOSE URINE, POC: NEGATIVE
KETONES, URINE, POC: NEGATIVE
LEUKOCYTE ESTERASE, URINE, POC: NORMAL
NITRITE, URINE, POC: NEGATIVE
PH, URINE, POC: 5.5 (ref 4.6–8)
PROTEIN,URINE, POC: NEGATIVE
SERVICE CMNT-IMP: NORMAL
SPECIFIC GRAVITY, URINE, POC: 1.01 (ref 1–1.03)
URINALYSIS CLARITY, POC: NORMAL
URINALYSIS COLOR, POC: NORMAL
UROBILINOGEN, POC: NORMAL

## 2024-08-16 PROCEDURE — 99214 OFFICE O/P EST MOD 30 MIN: CPT | Performed by: NURSE PRACTITIONER

## 2024-08-16 PROCEDURE — G8427 DOCREV CUR MEDS BY ELIG CLIN: HCPCS | Performed by: NURSE PRACTITIONER

## 2024-08-16 PROCEDURE — G8399 PT W/DXA RESULTS DOCUMENT: HCPCS | Performed by: NURSE PRACTITIONER

## 2024-08-16 PROCEDURE — 3017F COLORECTAL CA SCREEN DOC REV: CPT | Performed by: NURSE PRACTITIONER

## 2024-08-16 PROCEDURE — 4004F PT TOBACCO SCREEN RCVD TLK: CPT | Performed by: NURSE PRACTITIONER

## 2024-08-16 PROCEDURE — G8419 CALC BMI OUT NRM PARAM NOF/U: HCPCS | Performed by: NURSE PRACTITIONER

## 2024-08-16 PROCEDURE — 81003 URINALYSIS AUTO W/O SCOPE: CPT | Performed by: NURSE PRACTITIONER

## 2024-08-16 PROCEDURE — 1123F ACP DISCUSS/DSCN MKR DOCD: CPT | Performed by: NURSE PRACTITIONER

## 2024-08-16 PROCEDURE — 74018 RADEX ABDOMEN 1 VIEW: CPT

## 2024-08-16 PROCEDURE — 1090F PRES/ABSN URINE INCON ASSESS: CPT | Performed by: NURSE PRACTITIONER

## 2024-08-16 RX ORDER — TAMSULOSIN HYDROCHLORIDE 0.4 MG/1
0.4 CAPSULE ORAL DAILY
Qty: 30 CAPSULE | Refills: 1 | Status: SHIPPED | OUTPATIENT
Start: 2024-08-16

## 2024-08-16 ASSESSMENT — ENCOUNTER SYMPTOMS: BACK PAIN: 0

## 2024-08-16 NOTE — TELEPHONE ENCOUNTER
[facility]  XRAY is down. Would she be able to go to [facility], 2nd floor.( Out Center) to get a XRAY 1 hour before appt.  Pt will get KUB

## 2024-08-16 NOTE — PROGRESS NOTES
[facility]  [address]  [place] [address]  [phone]          Jolynn SERRA Stone  : 1955    Chief Complaint   Patient presents with    Nephrolithiasis    Follow-up          HPI     Jolynn SERRA Stone is a 69 y.o. female followed for kidney stones. CT abd pelvis in [place]  showed 9 mm right UPJ stone with mild hydronephrosis. S/P R ESWL on  by Dr. Greenberg. Post op KUB clear. She did not fu after this.     She had CT A/P in Aug 24 for abd pain showing a 2 mm stone to distal right ureter w mod hydro, guille non obs renal stones, and constipation. Images reviewed. She is feeling better today. Notes discomfort to LLQ. KUB today reviewed by myself and shows no stone; gas/stool pattern. Drinks a lot of coffee.             Past Medical History:   Diagnosis Date    Anterior cerebral aneurysm 2018    above riaght eye    Anxiety disorder     Back pain     Brain aneurysm     times  2 - one ruptured in surgery and caused a stroke     Cerebral hemorrhage (HCC)     Hematuria, microscopic     History of drug dependence (HCC)     opioid type, continuous    History of vitamin D deficiency     Hypercholesterolemia     Joint pain     right knee     Kidney stone     3/2022   9 mm stone     Personal history of colonic polyps     removed     Stroke (HCC)      - stroke during surgery - lasting right side weakness     Unspecified late effects of cerebrovascular disease      Past Surgical History:   Procedure Laterality Date    BREAST SURGERY      COLONOSCOPY      GYN      LITHOTRIPSY  2022    9 mm stone     NEUROLOGICAL SURGERY      ORTHOPEDIC SURGERY       Current Outpatient Medications   Medication Sig Dispense Refill    tamsulosin (FLOMAX) 0.4 MG capsule Take 1 capsule by mouth daily 30 capsule 1    ciprofloxacin (CIPRO) 500 MG tablet Take 1 tablet by mouth 2 times daily for 3 days 6 tablet 0    gabapentin (NEURONTIN) 600 MG tablet Take 1 tablet by mouth 3 times daily for 180

## 2024-10-15 ENCOUNTER — OFFICE VISIT (OUTPATIENT)
Dept: FAMILY MEDICINE CLINIC | Facility: CLINIC | Age: 69
End: 2024-10-15
Payer: MEDICARE

## 2024-10-15 VITALS
HEART RATE: 71 BPM | WEIGHT: 105 LBS | BODY MASS INDEX: 17.93 KG/M2 | DIASTOLIC BLOOD PRESSURE: 72 MMHG | HEIGHT: 64 IN | TEMPERATURE: 98.2 F | SYSTOLIC BLOOD PRESSURE: 112 MMHG | OXYGEN SATURATION: 91 %

## 2024-10-15 DIAGNOSIS — E78.00 PURE HYPERCHOLESTEROLEMIA, UNSPECIFIED: ICD-10-CM

## 2024-10-15 DIAGNOSIS — K21.9 GASTROESOPHAGEAL REFLUX DISEASE, UNSPECIFIED WHETHER ESOPHAGITIS PRESENT: ICD-10-CM

## 2024-10-15 DIAGNOSIS — Z12.11 SCREENING FOR COLON CANCER: ICD-10-CM

## 2024-10-15 DIAGNOSIS — R79.9 ABNORMAL FINDING OF BLOOD CHEMISTRY, UNSPECIFIED: ICD-10-CM

## 2024-10-15 DIAGNOSIS — Z00.00 MEDICARE ANNUAL WELLNESS VISIT, SUBSEQUENT: ICD-10-CM

## 2024-10-15 DIAGNOSIS — Z00.00 MEDICARE ANNUAL WELLNESS VISIT, SUBSEQUENT: Primary | ICD-10-CM

## 2024-10-15 DIAGNOSIS — E55.9 VITAMIN D DEFICIENCY, UNSPECIFIED: ICD-10-CM

## 2024-10-15 DIAGNOSIS — I69.359 HEMIPLEGIA AND HEMIPARESIS FOLLOWING CEREBRAL INFARCTION AFFECTING UNSPECIFIED SIDE (HCC): ICD-10-CM

## 2024-10-15 LAB
25(OH)D3 SERPL-MCNC: 20.8 NG/ML (ref 30–100)
ALBUMIN SERPL-MCNC: 4.3 G/DL (ref 3.2–4.6)
ALBUMIN/GLOB SERPL: 1.3 (ref 1–1.9)
ALP SERPL-CCNC: 109 U/L (ref 35–104)
ALT SERPL-CCNC: 13 U/L (ref 8–45)
ANION GAP SERPL CALC-SCNC: 10 MMOL/L (ref 9–18)
AST SERPL-CCNC: 21 U/L (ref 15–37)
BASOPHILS # BLD: 0.1 K/UL (ref 0–0.2)
BASOPHILS NFR BLD: 0 % (ref 0–2)
BILIRUB SERPL-MCNC: 0.3 MG/DL (ref 0–1.2)
BUN SERPL-MCNC: 10 MG/DL (ref 8–23)
CALCIUM SERPL-MCNC: 9.7 MG/DL (ref 8.8–10.2)
CHLORIDE SERPL-SCNC: 104 MMOL/L (ref 98–107)
CHOLEST SERPL-MCNC: 146 MG/DL (ref 0–200)
CO2 SERPL-SCNC: 26 MMOL/L (ref 20–28)
CREAT SERPL-MCNC: 0.73 MG/DL (ref 0.6–1.1)
DIFFERENTIAL METHOD BLD: ABNORMAL
EOSINOPHIL # BLD: 0.1 K/UL (ref 0–0.8)
EOSINOPHIL NFR BLD: 1 % (ref 0.5–7.8)
ERYTHROCYTE [DISTWIDTH] IN BLOOD BY AUTOMATED COUNT: 13.2 % (ref 11.9–14.6)
EST. AVERAGE GLUCOSE BLD GHB EST-MCNC: 128 MG/DL
GLOBULIN SER CALC-MCNC: 3.2 G/DL (ref 2.3–3.5)
GLUCOSE SERPL-MCNC: 97 MG/DL (ref 70–99)
HBA1C MFR BLD: 6.1 % (ref 0–5.6)
HCT VFR BLD AUTO: 42.6 % (ref 35.8–46.3)
HDLC SERPL-MCNC: 48 MG/DL (ref 40–60)
HDLC SERPL: 3 (ref 0–5)
HGB BLD-MCNC: 13.8 G/DL (ref 11.7–15.4)
IMM GRANULOCYTES # BLD AUTO: 0.1 K/UL (ref 0–0.5)
IMM GRANULOCYTES NFR BLD AUTO: 0 % (ref 0–5)
LDLC SERPL CALC-MCNC: 79 MG/DL (ref 0–100)
LYMPHOCYTES # BLD: 2.9 K/UL (ref 0.5–4.6)
LYMPHOCYTES NFR BLD: 19 % (ref 13–44)
MCH RBC QN AUTO: 31.6 PG (ref 26.1–32.9)
MCHC RBC AUTO-ENTMCNC: 32.4 G/DL (ref 31.4–35)
MCV RBC AUTO: 97.5 FL (ref 82–102)
MONOCYTES # BLD: 0.7 K/UL (ref 0.1–1.3)
MONOCYTES NFR BLD: 4 % (ref 4–12)
NEUTS SEG # BLD: 11.7 K/UL (ref 1.7–8.2)
NEUTS SEG NFR BLD: 76 % (ref 43–78)
NRBC # BLD: 0 K/UL (ref 0–0.2)
PLATELET # BLD AUTO: 212 K/UL (ref 150–450)
PMV BLD AUTO: 12.2 FL (ref 9.4–12.3)
POTASSIUM SERPL-SCNC: 4.5 MMOL/L (ref 3.5–5.1)
PROT SERPL-MCNC: 7.5 G/DL (ref 6.3–8.2)
RBC # BLD AUTO: 4.37 M/UL (ref 4.05–5.2)
SODIUM SERPL-SCNC: 140 MMOL/L (ref 136–145)
TRIGL SERPL-MCNC: 95 MG/DL (ref 0–150)
TSH, 3RD GENERATION: 0.52 UIU/ML (ref 0.27–4.2)
VLDLC SERPL CALC-MCNC: 19 MG/DL (ref 6–23)
WBC # BLD AUTO: 15.4 K/UL (ref 4.3–11.1)

## 2024-10-15 PROCEDURE — 1090F PRES/ABSN URINE INCON ASSESS: CPT | Performed by: FAMILY MEDICINE

## 2024-10-15 PROCEDURE — G8419 CALC BMI OUT NRM PARAM NOF/U: HCPCS | Performed by: FAMILY MEDICINE

## 2024-10-15 PROCEDURE — 99214 OFFICE O/P EST MOD 30 MIN: CPT | Performed by: FAMILY MEDICINE

## 2024-10-15 PROCEDURE — 1123F ACP DISCUSS/DSCN MKR DOCD: CPT | Performed by: FAMILY MEDICINE

## 2024-10-15 PROCEDURE — G0439 PPPS, SUBSEQ VISIT: HCPCS | Performed by: FAMILY MEDICINE

## 2024-10-15 PROCEDURE — G8427 DOCREV CUR MEDS BY ELIG CLIN: HCPCS | Performed by: FAMILY MEDICINE

## 2024-10-15 PROCEDURE — 3017F COLORECTAL CA SCREEN DOC REV: CPT | Performed by: FAMILY MEDICINE

## 2024-10-15 PROCEDURE — G8484 FLU IMMUNIZE NO ADMIN: HCPCS | Performed by: FAMILY MEDICINE

## 2024-10-15 PROCEDURE — G8399 PT W/DXA RESULTS DOCUMENT: HCPCS | Performed by: FAMILY MEDICINE

## 2024-10-15 PROCEDURE — 4004F PT TOBACCO SCREEN RCVD TLK: CPT | Performed by: FAMILY MEDICINE

## 2024-10-15 RX ORDER — ATORVASTATIN CALCIUM 20 MG/1
TABLET, FILM COATED ORAL
Qty: 90 TABLET | Refills: 3 | Status: SHIPPED | OUTPATIENT
Start: 2024-10-15

## 2024-10-15 SDOH — ECONOMIC STABILITY: FOOD INSECURITY: WITHIN THE PAST 12 MONTHS, YOU WORRIED THAT YOUR FOOD WOULD RUN OUT BEFORE YOU GOT MONEY TO BUY MORE.: NEVER TRUE

## 2024-10-15 SDOH — ECONOMIC STABILITY: FOOD INSECURITY: WITHIN THE PAST 12 MONTHS, THE FOOD YOU BOUGHT JUST DIDN'T LAST AND YOU DIDN'T HAVE MONEY TO GET MORE.: NEVER TRUE

## 2024-10-15 SDOH — ECONOMIC STABILITY: INCOME INSECURITY: HOW HARD IS IT FOR YOU TO PAY FOR THE VERY BASICS LIKE FOOD, HOUSING, MEDICAL CARE, AND HEATING?: NOT HARD AT ALL

## 2024-10-15 ASSESSMENT — PATIENT HEALTH QUESTIONNAIRE - PHQ9
1. LITTLE INTEREST OR PLEASURE IN DOING THINGS: NOT AT ALL
SUM OF ALL RESPONSES TO PHQ QUESTIONS 1-9: 0
SUM OF ALL RESPONSES TO PHQ QUESTIONS 1-9: 0
2. FEELING DOWN, DEPRESSED OR HOPELESS: NOT AT ALL
SUM OF ALL RESPONSES TO PHQ QUESTIONS 1-9: 0
SUM OF ALL RESPONSES TO PHQ QUESTIONS 1-9: 0
SUM OF ALL RESPONSES TO PHQ9 QUESTIONS 1 & 2: 0

## 2024-10-15 NOTE — PROGRESS NOTES
wellness visit completed  Refill chronic medications  Check labs today  See dentist every 6 months  See eye physician or get eyes checked every 1-2 years  Immunizations reviewed and discussed with patient  Fit test given for colon cancer screening as requested,  Due for mammogram upcoming this year.    SUBJECTIVE/OBJECTIVE:  HPI  Hyperlipidemia - stable, well controlled, takes medication as prescribed, denies any chest pain, shortness of breath, dizziness, headaches or vision changes.    History of cerebral aneurysm previously with CVA, follows with neurosurgery, also sees pain management for a number of her medications, including Subutex and Klonopin        Physical Exam  Vitals and nursing note reviewed.   Constitutional:       General: She is not in acute distress.     Appearance: Normal appearance. She is not ill-appearing.   HENT:      Head: Normocephalic and atraumatic.      Right Ear: External ear normal.      Left Ear: External ear normal.      Nose: Nose normal.      Mouth/Throat:      Mouth: Mucous membranes are dry.   Eyes:      Extraocular Movements: Extraocular movements intact.      Pupils: Pupils are equal, round, and reactive to light.   Cardiovascular:      Rate and Rhythm: Normal rate.      Pulses: Normal pulses.   Pulmonary:      Effort: Pulmonary effort is normal.      Breath sounds: Normal breath sounds.   Abdominal:      General: There is no distension.   Musculoskeletal:         General: Normal range of motion.      Cervical back: Normal range of motion and neck supple.   Skin:     General: Skin is warm and dry.   Neurological:      General: No focal deficit present.      Mental Status: She is alert and oriented to person, place, and time.   Psychiatric:         Mood and Affect: Mood normal.         On this date 10/15/24  I have spent 30 minutes reviewing previous notes, test results and face to face with the patient discussing the diagnosis and importance of compliance with the treatment plan

## 2024-10-15 NOTE — PATIENT INSTRUCTIONS
questions about a medical condition or this instruction, always ask your healthcare professional. [employer], [employer] disclaims any warranty or liability for your use of this information.      Personalized Preventive Plan for Jolynn Martines - 10/15/2024  Medicare offers a range of preventive health benefits. Some of the tests and screenings are paid in full while other may be subject to a deductible, co-insurance, and/or copay.    Some of these benefits include a comprehensive review of your medical history including lifestyle, illnesses that may run in your family, and various assessments and screenings as appropriate.    After reviewing your medical record and screening and assessments performed today your provider may have ordered immunizations, labs, imaging, and/or referrals for you.  A list of these orders (if applicable) as well as your Preventive Care list are included within your After Visit Summary for your review.    Other Preventive Recommendations:    A preventive eye exam performed by an eye specialist is recommended every 1-2 years to screen for glaucoma; cataracts, macular degeneration, and other eye disorders.  A preventive dental visit is recommended every 6 months.  Try to get at least 150 minutes of exercise per week or 10,000 steps per day on a pedometer .  Order or download the FREE \"Exercise & Physical Activity: Your Everyday Guide\" from [employer]. Call 1-490.836.5023 or search [employer] online.  You need 9916-3241 mg of calcium and 4438-8355 IU of vitamin D per day. It is possible to meet your calcium requirement with diet alone, but a vitamin D supplement is usually necessary to meet this goal.  When exposed to the sun, use a sunscreen that protects against both UVA and UVB radiation with an SPF of 30 or greater. Reapply every 2 to 3 hours or after sweating, drying off with a towel, or swimming.  Always wear a seat belt when traveling in a

## 2024-10-17 NOTE — RESULT ENCOUNTER NOTE
Please let her know her white count is high, I would like her to come back to see us in a few weeks, any provider, and we likely need to recheck this as well as a urine to make sure we are not missing an infection.

## 2024-10-28 ENCOUNTER — HOSPITAL ENCOUNTER (OUTPATIENT)
Dept: CT IMAGING | Age: 69
Discharge: HOME | End: 2024-10-31
Payer: MEDICARE

## 2024-10-28 DIAGNOSIS — I67.1 CEREBRAL ANEURYSM: ICD-10-CM

## 2024-10-28 PROCEDURE — 70496 CT ANGIOGRAPHY HEAD: CPT

## 2024-10-28 PROCEDURE — 70496 CT ANGIOGRAPHY HEAD: CPT | Performed by: RADIOLOGY

## 2024-10-28 PROCEDURE — 6360000004 HC RX CONTRAST MEDICATION: Performed by: NEUROLOGICAL SURGERY

## 2024-10-28 RX ORDER — IOPAMIDOL 755 MG/ML
50 INJECTION, SOLUTION INTRAVASCULAR
Status: COMPLETED | OUTPATIENT
Start: 2024-10-28 | End: 2024-10-28

## 2024-10-28 RX ADMIN — IOPAMIDOL 50 ML: 755 INJECTION, SOLUTION INTRAVENOUS at 11:36

## 2024-11-06 ENCOUNTER — TELEPHONE (OUTPATIENT)
Dept: FAMILY MEDICINE CLINIC | Facility: CLINIC | Age: 69
End: 2024-11-06

## 2024-11-06 LAB
HEMOCCULT STL QL: NEGATIVE
VALID INTERNAL CONTROL: YES

## 2024-12-04 ENCOUNTER — TELEPHONE (OUTPATIENT)
Dept: FAMILY MEDICINE CLINIC | Facility: CLINIC | Age: 69
End: 2024-12-04

## 2024-12-04 NOTE — TELEPHONE ENCOUNTER
----- Message from Sonia HARRIET sent at 12/4/2024  2:54 PM EST -----  Regarding: CC Results Request  ECC Results Request    Which lab or imaging result is the patient calling about: Lab Result    Which provider ordered the test?   Mallory Portillo MD    Was this a Non-[facility] Provider: Yes    Date the test was preformed 10/16/2024  --------------------------------------------------------------------------------------------------------------------------    Relationship to Patient: Self     Call Back Info: OK to leave message on voicemail  Preferred Call Back Number: Phone  [phone]

## 2024-12-04 NOTE — TELEPHONE ENCOUNTER
----- Message from Sonia HARRIET sent at 12/4/2024  2:54 PM EST -----  Regarding: CC Results Request  ECC Results Request    Which lab or imaging result is the patient calling about: Lab Result    Which provider ordered the test?   Mallory Portillo MD    Was this a Non-[facility] Provider: Yes    Date the test was preformed 10/16/2024  --------------------------------------------------------------------------------------------------------------------------    Relationship to Patient: Self     Call Back Info: OK to leave message on voicemail  Preferred Call Back Number: Phone  [phone]

## 2024-12-05 NOTE — TELEPHONE ENCOUNTER
Please find out what results she is wanting, I did want her to come back in and see us after her last visit to get the white count rechecked.

## 2024-12-11 NOTE — TELEPHONE ENCOUNTER
The patient was notified 3 times to call the patient to see what results she is needing.  Letter will be sent to the patient to call the office.

## 2025-01-13 ENCOUNTER — TELEPHONE (OUTPATIENT)
Dept: FAMILY MEDICINE CLINIC | Facility: CLINIC | Age: 70
End: 2025-01-13

## 2025-01-13 NOTE — TELEPHONE ENCOUNTER
Patient states that she has been experiencing a dry deep cough. Patient has an upcoming appt on 1/17/25 but would like to know if she could get something prescribed to her or should she be seen in office.

## 2025-01-15 ENCOUNTER — APPOINTMENT (OUTPATIENT)
Dept: GENERAL RADIOLOGY | Age: 70
DRG: 193 | End: 2025-01-15
Payer: MEDICARE

## 2025-01-15 ENCOUNTER — HOSPITAL ENCOUNTER (INPATIENT)
Age: 70
LOS: 1 days | Discharge: HOME | DRG: 193 | End: 2025-01-17
Attending: EMERGENCY MEDICINE | Admitting: STUDENT IN AN ORGANIZED HEALTH CARE EDUCATION/TRAINING PROGRAM
Payer: MEDICARE

## 2025-01-15 ENCOUNTER — APPOINTMENT (OUTPATIENT)
Dept: CT IMAGING | Age: 70
DRG: 193 | End: 2025-01-15
Payer: MEDICARE

## 2025-01-15 DIAGNOSIS — J96.01 ACUTE HYPOXIC RESPIRATORY FAILURE: ICD-10-CM

## 2025-01-15 DIAGNOSIS — J18.9 PNEUMONIA OF RIGHT MIDDLE LOBE DUE TO INFECTIOUS ORGANISM: ICD-10-CM

## 2025-01-15 DIAGNOSIS — J96.01 ACUTE RESPIRATORY FAILURE WITH HYPOXIA: Primary | ICD-10-CM

## 2025-01-15 PROBLEM — Z72.0 TOBACCO USE: Status: ACTIVE | Noted: 2025-01-15

## 2025-01-15 LAB
ALBUMIN SERPL-MCNC: 4.2 G/DL (ref 3.2–4.6)
ALBUMIN/GLOB SERPL: 1.1 (ref 1–1.9)
ALP SERPL-CCNC: 121 U/L (ref 35–104)
ALT SERPL-CCNC: 15 U/L (ref 8–45)
ANION GAP SERPL CALC-SCNC: 13 MMOL/L (ref 7–16)
AST SERPL-CCNC: 23 U/L (ref 15–37)
BASOPHILS # BLD: 0.05 K/UL (ref 0–0.2)
BASOPHILS NFR BLD: 0.5 % (ref 0–2)
BILIRUB SERPL-MCNC: 0.4 MG/DL (ref 0–1.2)
BUN SERPL-MCNC: 13 MG/DL (ref 8–23)
CALCIUM SERPL-MCNC: 9.5 MG/DL (ref 8.8–10.2)
CHLORIDE SERPL-SCNC: 107 MMOL/L (ref 98–107)
CO2 SERPL-SCNC: 23 MMOL/L (ref 20–29)
CREAT SERPL-MCNC: 0.79 MG/DL (ref 0.6–1.1)
DIFFERENTIAL METHOD BLD: NORMAL
EOSINOPHIL # BLD: 0.09 K/UL (ref 0–0.8)
EOSINOPHIL NFR BLD: 0.9 % (ref 0.5–7.8)
ERYTHROCYTE [DISTWIDTH] IN BLOOD BY AUTOMATED COUNT: 13.3 % (ref 11.9–14.6)
FLUAV RNA SPEC QL NAA+PROBE: NOT DETECTED
FLUBV RNA SPEC QL NAA+PROBE: NOT DETECTED
GLOBULIN SER CALC-MCNC: 3.8 G/DL (ref 2.3–3.5)
GLUCOSE SERPL-MCNC: 108 MG/DL (ref 70–99)
HCT VFR BLD AUTO: 43.3 % (ref 35.8–46.3)
HGB BLD-MCNC: 14.5 G/DL (ref 11.7–15.4)
IMM GRANULOCYTES # BLD AUTO: 0.06 K/UL (ref 0–0.5)
IMM GRANULOCYTES NFR BLD AUTO: 0.6 % (ref 0–5)
LYMPHOCYTES # BLD: 2.06 K/UL (ref 0.5–4.6)
LYMPHOCYTES NFR BLD: 20.6 % (ref 13–44)
MCH RBC QN AUTO: 31.6 PG (ref 26.1–32.9)
MCHC RBC AUTO-ENTMCNC: 33.5 G/DL (ref 31.4–35)
MCV RBC AUTO: 94.3 FL (ref 82–102)
MONOCYTES # BLD: 0.5 K/UL (ref 0.1–1.3)
MONOCYTES NFR BLD: 5 % (ref 4–12)
NEUTS SEG # BLD: 7.24 K/UL (ref 1.7–8.2)
NEUTS SEG NFR BLD: 72.4 % (ref 43–78)
NRBC # BLD: 0 K/UL (ref 0–0.2)
PLATELET # BLD AUTO: 211 K/UL (ref 150–450)
PLATELET COMMENT: ADEQUATE
PMV BLD AUTO: 11.5 FL (ref 9.4–12.3)
POTASSIUM SERPL-SCNC: 4.3 MMOL/L (ref 3.5–5.1)
PROT SERPL-MCNC: 8 G/DL (ref 6.3–8.2)
RBC # BLD AUTO: 4.59 M/UL (ref 4.05–5.2)
RBC MORPH BLD: NORMAL
RSV RNA NPH QL NAA+PROBE: NOT DETECTED
SARS-COV-2 RDRP RESP QL NAA+PROBE: NOT DETECTED
SODIUM SERPL-SCNC: 143 MMOL/L (ref 136–145)
SOURCE: NORMAL
SOURCE: NORMAL
WBC # BLD AUTO: 10 K/UL (ref 4.3–11.1)
WBC MORPH BLD: NORMAL

## 2025-01-15 PROCEDURE — 87502 INFLUENZA DNA AMP PROBE: CPT

## 2025-01-15 PROCEDURE — 87635 SARS-COV-2 COVID-19 AMP PRB: CPT

## 2025-01-15 PROCEDURE — 96374 THER/PROPH/DIAG INJ IV PUSH: CPT

## 2025-01-15 PROCEDURE — 87634 RSV DNA/RNA AMP PROBE: CPT

## 2025-01-15 PROCEDURE — G0378 HOSPITAL OBSERVATION PER HR: HCPCS

## 2025-01-15 PROCEDURE — 94760 N-INVAS EAR/PLS OXIMETRY 1: CPT

## 2025-01-15 PROCEDURE — 71250 CT THORAX DX C-: CPT

## 2025-01-15 PROCEDURE — 2500000003 HC RX 250 WO HCPCS: Performed by: STUDENT IN AN ORGANIZED HEALTH CARE EDUCATION/TRAINING PROGRAM

## 2025-01-15 PROCEDURE — 96375 TX/PRO/DX INJ NEW DRUG ADDON: CPT

## 2025-01-15 PROCEDURE — 6370000000 HC RX 637 (ALT 250 FOR IP): Performed by: EMERGENCY MEDICINE

## 2025-01-15 PROCEDURE — 6370000000 HC RX 637 (ALT 250 FOR IP): Performed by: STUDENT IN AN ORGANIZED HEALTH CARE EDUCATION/TRAINING PROGRAM

## 2025-01-15 PROCEDURE — 2700000000 HC OXYGEN THERAPY PER DAY

## 2025-01-15 PROCEDURE — 71046 X-RAY EXAM CHEST 2 VIEWS: CPT

## 2025-01-15 PROCEDURE — 2500000003 HC RX 250 WO HCPCS: Performed by: EMERGENCY MEDICINE

## 2025-01-15 PROCEDURE — 6360000002 HC RX W HCPCS: Performed by: EMERGENCY MEDICINE

## 2025-01-15 PROCEDURE — 85025 COMPLETE CBC W/AUTO DIFF WBC: CPT

## 2025-01-15 PROCEDURE — 94640 AIRWAY INHALATION TREATMENT: CPT

## 2025-01-15 PROCEDURE — 99285 EMERGENCY DEPT VISIT HI MDM: CPT

## 2025-01-15 PROCEDURE — 80053 COMPREHEN METABOLIC PANEL: CPT

## 2025-01-15 RX ORDER — LEVALBUTEROL INHALATION SOLUTION 0.63 MG/3ML
0.63 SOLUTION RESPIRATORY (INHALATION) EVERY 8 HOURS PRN
Status: DISCONTINUED | OUTPATIENT
Start: 2025-01-15 | End: 2025-01-17 | Stop reason: HOSPADM

## 2025-01-15 RX ORDER — SODIUM CHLORIDE 9 MG/ML
INJECTION, SOLUTION INTRAVENOUS PRN
Status: DISCONTINUED | OUTPATIENT
Start: 2025-01-15 | End: 2025-01-17 | Stop reason: HOSPADM

## 2025-01-15 RX ORDER — CLONAZEPAM 0.5 MG/1
0.5 TABLET ORAL EVERY 12 HOURS PRN
Status: DISCONTINUED | OUTPATIENT
Start: 2025-01-15 | End: 2025-01-17 | Stop reason: HOSPADM

## 2025-01-15 RX ORDER — ONDANSETRON 4 MG/1
4 TABLET, ORALLY DISINTEGRATING ORAL EVERY 8 HOURS PRN
Status: DISCONTINUED | OUTPATIENT
Start: 2025-01-15 | End: 2025-01-17 | Stop reason: HOSPADM

## 2025-01-15 RX ORDER — ACETAMINOPHEN 325 MG/1
650 TABLET ORAL EVERY 6 HOURS PRN
Status: DISCONTINUED | OUTPATIENT
Start: 2025-01-15 | End: 2025-01-17 | Stop reason: HOSPADM

## 2025-01-15 RX ORDER — ENOXAPARIN SODIUM 100 MG/ML
30 INJECTION SUBCUTANEOUS DAILY
Status: DISCONTINUED | OUTPATIENT
Start: 2025-01-16 | End: 2025-01-17 | Stop reason: HOSPADM

## 2025-01-15 RX ORDER — ACETAMINOPHEN 650 MG/1
650 SUPPOSITORY RECTAL EVERY 6 HOURS PRN
Status: DISCONTINUED | OUTPATIENT
Start: 2025-01-15 | End: 2025-01-17 | Stop reason: HOSPADM

## 2025-01-15 RX ORDER — BUPRENORPHINE 2 MG/1
8 TABLET SUBLINGUAL 2 TIMES DAILY
Status: CANCELLED | OUTPATIENT
Start: 2025-01-16

## 2025-01-15 RX ORDER — AZITHROMYCIN 250 MG/1
500 TABLET, FILM COATED ORAL ONCE
Status: COMPLETED | OUTPATIENT
Start: 2025-01-15 | End: 2025-01-15

## 2025-01-15 RX ORDER — GABAPENTIN 300 MG/1
600 CAPSULE ORAL 3 TIMES DAILY
Status: DISCONTINUED | OUTPATIENT
Start: 2025-01-15 | End: 2025-01-17 | Stop reason: HOSPADM

## 2025-01-15 RX ORDER — BUPRENORPHINE 2 MG/1
8 TABLET SUBLINGUAL 2 TIMES DAILY
Status: DISCONTINUED | OUTPATIENT
Start: 2025-01-16 | End: 2025-01-15

## 2025-01-15 RX ORDER — BUPRENORPHINE 2 MG/1
2.5 TABLET SUBLINGUAL DAILY
Status: DISCONTINUED | OUTPATIENT
Start: 2025-01-15 | End: 2025-01-15 | Stop reason: SDUPTHER

## 2025-01-15 RX ORDER — ATORVASTATIN CALCIUM 20 MG/1
20 TABLET, FILM COATED ORAL DAILY
Status: DISCONTINUED | OUTPATIENT
Start: 2025-01-15 | End: 2025-01-17 | Stop reason: HOSPADM

## 2025-01-15 RX ORDER — GUAIFENESIN 600 MG/1
600 TABLET, EXTENDED RELEASE ORAL 2 TIMES DAILY
Status: CANCELLED | OUTPATIENT
Start: 2025-01-15

## 2025-01-15 RX ORDER — DEXAMETHASONE SODIUM PHOSPHATE 10 MG/ML
10 INJECTION INTRAMUSCULAR; INTRAVENOUS ONCE
Status: COMPLETED | OUTPATIENT
Start: 2025-01-15 | End: 2025-01-15

## 2025-01-15 RX ORDER — GUAIFENESIN 600 MG/1
1200 TABLET, EXTENDED RELEASE ORAL 2 TIMES DAILY
Status: DISCONTINUED | OUTPATIENT
Start: 2025-01-15 | End: 2025-01-17 | Stop reason: HOSPADM

## 2025-01-15 RX ORDER — SODIUM CHLORIDE 0.9 % (FLUSH) 0.9 %
5-40 SYRINGE (ML) INJECTION EVERY 12 HOURS SCHEDULED
Status: DISCONTINUED | OUTPATIENT
Start: 2025-01-15 | End: 2025-01-17 | Stop reason: HOSPADM

## 2025-01-15 RX ORDER — NICOTINE 21 MG/24HR
1 PATCH, TRANSDERMAL 24 HOURS TRANSDERMAL DAILY
Status: DISCONTINUED | OUTPATIENT
Start: 2025-01-15 | End: 2025-01-17 | Stop reason: HOSPADM

## 2025-01-15 RX ORDER — BUPRENORPHINE 2 MG/1
8 TABLET SUBLINGUAL 2 TIMES DAILY
Status: DISCONTINUED | OUTPATIENT
Start: 2025-01-15 | End: 2025-01-17 | Stop reason: HOSPADM

## 2025-01-15 RX ORDER — POLYETHYLENE GLYCOL 3350 17 G/17G
17 POWDER, FOR SOLUTION ORAL DAILY PRN
Status: DISCONTINUED | OUTPATIENT
Start: 2025-01-15 | End: 2025-01-17 | Stop reason: HOSPADM

## 2025-01-15 RX ORDER — SODIUM CHLORIDE 0.9 % (FLUSH) 0.9 %
5-40 SYRINGE (ML) INJECTION PRN
Status: DISCONTINUED | OUTPATIENT
Start: 2025-01-15 | End: 2025-01-17 | Stop reason: HOSPADM

## 2025-01-15 RX ORDER — IPRATROPIUM BROMIDE AND ALBUTEROL SULFATE 2.5; .5 MG/3ML; MG/3ML
1 SOLUTION RESPIRATORY (INHALATION)
Status: COMPLETED | OUTPATIENT
Start: 2025-01-15 | End: 2025-01-15

## 2025-01-15 RX ORDER — AZITHROMYCIN 250 MG/1
500 TABLET, FILM COATED ORAL EVERY 24 HOURS
Status: COMPLETED | OUTPATIENT
Start: 2025-01-16 | End: 2025-01-17

## 2025-01-15 RX ORDER — ONDANSETRON 2 MG/ML
4 INJECTION INTRAMUSCULAR; INTRAVENOUS EVERY 6 HOURS PRN
Status: DISCONTINUED | OUTPATIENT
Start: 2025-01-15 | End: 2025-01-17 | Stop reason: HOSPADM

## 2025-01-15 RX ORDER — ALBUTEROL SULFATE 0.83 MG/ML
2.5 SOLUTION RESPIRATORY (INHALATION) EVERY 4 HOURS PRN
Status: DISCONTINUED | OUTPATIENT
Start: 2025-01-15 | End: 2025-01-17 | Stop reason: HOSPADM

## 2025-01-15 RX ADMIN — GABAPENTIN 600 MG: 300 CAPSULE ORAL at 22:25

## 2025-01-15 RX ADMIN — AZITHROMYCIN DIHYDRATE 500 MG: 250 TABLET ORAL at 10:37

## 2025-01-15 RX ADMIN — IPRATROPIUM BROMIDE AND ALBUTEROL SULFATE 1 DOSE: .5; 3 SOLUTION RESPIRATORY (INHALATION) at 09:39

## 2025-01-15 RX ADMIN — ATORVASTATIN CALCIUM 20 MG: 20 TABLET, FILM COATED ORAL at 16:12

## 2025-01-15 RX ADMIN — SODIUM CHLORIDE, PRESERVATIVE FREE 10 ML: 5 INJECTION INTRAVENOUS at 20:19

## 2025-01-15 RX ADMIN — DEXAMETHASONE SODIUM PHOSPHATE 10 MG: 10 INJECTION INTRAMUSCULAR; INTRAVENOUS at 09:22

## 2025-01-15 RX ADMIN — BUPRENORPHINE HCL 8 MG: 2 TABLET SUBLINGUAL at 16:59

## 2025-01-15 RX ADMIN — WATER 1000 MG: 1 INJECTION INTRAMUSCULAR; INTRAVENOUS; SUBCUTANEOUS at 10:38

## 2025-01-15 RX ADMIN — GUAIFENESIN 1200 MG: 600 TABLET ORAL at 20:19

## 2025-01-15 RX ADMIN — GABAPENTIN 600 MG: 300 CAPSULE ORAL at 16:12

## 2025-01-15 ASSESSMENT — LIFESTYLE VARIABLES
HOW OFTEN DO YOU HAVE A DRINK CONTAINING ALCOHOL: NEVER
HOW MANY STANDARD DRINKS CONTAINING ALCOHOL DO YOU HAVE ON A TYPICAL DAY: PATIENT DOES NOT DRINK

## 2025-01-15 ASSESSMENT — PAIN - FUNCTIONAL ASSESSMENT: PAIN_FUNCTIONAL_ASSESSMENT: 0-10

## 2025-01-15 ASSESSMENT — PAIN SCALES - GENERAL: PAINLEVEL_OUTOF10: 0

## 2025-01-15 NOTE — H&P
Hospitalist History and Physical   Admit Date:  1/15/2025  9:22 AM   Name:  Jolynn Martines   Age:  69 y.o.  Sex:  female  :  1955   MRN:  [id number]   Room:  [facility]/    Presenting/Chief Complaint: Cough and Shortness of Breath     Reason(s) for Admission: Acute hypoxic respiratory failure [J96.01]     History of Present Illness:   Jolynn Martines is a 69 y.o. female with medical history of osteoporosis, CVA with hemiparesis side effects, tobacco use who presented with shortness of breath.  Patient states that symptoms started suddenly today and has been associated with nonproductive cough.  She does not use any home oxygen and does not follow-up with a pulmonologist.  Smokes about 5-10 sticks of cigarettes daily and has been smoking for over 30 years.  On presentation vitals are stable except for /68, O2 sat dropping to the high 80s on room air per ED physician.  Labs were WNL.  Chest x-ray shows hyperinflated lungs with airspace disease of right middle lobe and CT chest recommended.  Patient was placed on supplemental oxygen and admitted for further management.    Assessment & Plan:   Acute hypoxic respiratory failure  Pneumonia  On nasal cannula, continue weaning as tolerated  Chest x-ray shows hyperinflated lungs with airspace disease of right middle lobe and CT chest recommended  CT chest ordered  Send on Rocephin, azithromycin  Breathing treatment  Mucinex for cough  Consider pulm consult pending on patient's progression, possible most likely need outpatient pulm follow-up    Pure hypercholesterolemia  Continue atorvastatin    Anxiety disorder  Continue Klonopin as needed  On gabapentin, Subutex, follows pain management outpatient per chart review    History of cerebral aneurysm, nonruptured  Hemiparesis affecting dominant side as late effect of cerebrovascular accident (HCC)  Noted    Osteoporosis  Noted  On alendronate    Tobacco use  Noted, complicates care  Cessation strongly  Nasopharyngeal      RSV by NAAT Not detected NOTD         Recent Labs     01/15/25  0915   COVID19 Not detected       XR CHEST (2 VW)    Result Date: 1/15/2025  Chest X-ray INDICATION: Cough, SOB COMPARISON: No recent prior exams for comparison TECHNIQUE: PA and lateral views of the chest were obtained.     Findings/impression: The lungs are hyperinflated. Atelectasis/airspace disease of the right middle lobe demonstrated. Mildly prominent interstitial markings seen throughout the lungs likely chronic. Senescent changes of the thoracic aorta and spine. No acute finding of the cardiomediastinal silhouette. Given the lack of comparison exams follow-up CT chest may be useful. At a minimum recommend follow-up to radiographic resolution. Electronically signed by Ramy Rob        Signed:  Sandi Donnelly MD    Part of this note may have been written by using a voice dictation software.  The note has been proof read but may still contain some grammatical/other typographical errors.

## 2025-01-15 NOTE — ED TRIAGE NOTES
Pt BIB EMS from home for c/o of cough x a couple of days and SHOB that started this morning. Pts o2 on RA 91-93%. Pt arrives on 4lpm via NC and o2 at 99%. Pt denies any other complaints. Denies any pain. 158/80, HR 80, Temp 98.3.

## 2025-01-15 NOTE — ED NOTES
Pt report and care transferred to [facility] at this time.       Aime Holloway, RN  01/15/25 [id number]

## 2025-01-15 NOTE — ED PROVIDER NOTES
Emergency Department Provider Note                   PCP:                Mallory Portillo MD               Age: 69 y.o.      Sex: female       ICD-10-CM    1. Acute respiratory failure with hypoxia  J96.01       2. Pneumonia of right middle lobe due to infectious organism  J18.9           DISPOSITION Decision To Admit 01/15/2025 11:06:55 AM   DISPOSITION CONDITION Stable             MDM  Number of Diagnoses or Management Options  Acute respiratory failure with hypoxia  Pneumonia of right middle lobe due to infectious organism  Diagnosis management comments: MEDICAL DECISION MAKING  Complexity of Problems Addressed:  1 or more acute illnesses that pose a threat to life or bodily function.     Data Reviewed and Analyzed:  Category 1:   I independently ordered and reviewed each unique test.    Category 3: Discussion of management or test interpretation.  The patient presents with nonproductive cough and SOB. IV steroids and neb treatment ordered for rhonchi. Oxygen sats were borderline for EMS. CXR is read as RML infiltrate, IV Rocephin and Zithromax are given. The patient was placed on room air and her oxygen saturation dropped to 89% at rest. Suspect that she has some underlying pulmonary disease related to her smoking history. Given the oxygen requirement, hospitalist was consulted for admission.   The patient was admitted and I have discussed patient management with the admitting provider.    Risk of Complications and/or Morbidity of Patient Management:  Shared medical decision making was utilized in creating the patients health plan today.                Orders Placed This Encounter   Procedures    COVID-19, Rapid    Influenza A/B, Molecular    Respiratory Syncytial Virus, Molecular (Restricted: peds pts or suitable admitted adults)    XR CHEST (2 VW)    CBC with Auto Differential    CMP    Saline lock IV        Medications   ipratropium 0.5 mg-albuterol 2.5 mg (DUONEB) nebulizer solution 1 Dose (1 Dose  Onset    Heart Attack Father     Heart Disease Father     Elevated Lipids Father     Breast Cancer Paternal Grandmother         Social History     Socioeconomic History    Marital status:    Tobacco Use    Smoking status: Some Days     Current packs/day: 0.00     Average packs/day: 0.5 packs/day for 15.0 years (7.5 ttl pk-yrs)     Types: Cigarettes     Start date: 2000     Last attempt to quit: 2015     Years since quittin.7    Smokeless tobacco: Never    Tobacco comments:     1/2 pack a week    Vaping Use    Vaping status: Never Used   Substance and Sexual Activity    Alcohol use: No    Drug use: No    Sexual activity: Yes     Partners: Male     Comment:      Social Determinants of Health     Financial Resource Strain: Low Risk  (10/15/2024)    Overall Financial Resource Strain (CARDIA)     Difficulty of Paying Living Expenses: Not hard at all   Food Insecurity: No Food Insecurity (10/15/2024)    Hunger Vital Sign     Worried About Running Out of Food in the Last Year: Never true     Ran Out of Food in the Last Year: Never true   Transportation Needs: Unknown (10/15/2024)    PRAPARE - Transportation     Lack of Transportation (Non-Medical): No   Physical Activity: Inactive (10/15/2024)    Exercise Vital Sign     Days of Exercise per Week: 0 days     Minutes of Exercise per Session: 0 min   Social Connections: Unknown (3/19/2021)    Received from [facility]    Social Connections     Frequency of Communication with Friends and Family: Not asked     Frequency of Social Gatherings with Friends and Family: Not asked   Intimate Partner Violence: Unknown (3/19/2021)    Received from [facility]    Intimate Partner Violence     Fear of Current or Ex-Partner: Not asked     Emotionally Abused: Not asked     Physically Abused: Not asked     Sexually Abused: Not asked   Housing Stability: Unknown (10/15/2024)    Housing Stability Vital Sign     Homeless in the Last

## 2025-01-15 NOTE — PROGRESS NOTES
Patient notified RN that she would like to leave and come back later. She is concerned about her dog at home. RN notified patient that if she left she would have to come back in to the ER and start all over with her care. Patient agreed to stay at this time. RN notified MD of this. Will update nightshift RN as well.

## 2025-01-15 NOTE — ED NOTES
TRANSFER - OUT REPORT:    Verbal report given to Denae IBANEZ on Jolynn Martines  [place] [facility] [place]       Report consisted of patient's Situation, Background, Assessment and   Recommendations(SBAR).     Information from the following report(s) ED Encounter Summary, ED SBAR, MAR, and Recent Results was reviewed with the receiving nurse.    [place] Assessment:    Presents to emergency department  because of falls (Syncope, seizure, or loss of consciousness): No  Age > 70: No  Altered Mental Status, Intoxication with alcohol or substance confusion (Disorientation, impaired judgment, poor safety awaremess, or inability to follow instructions): No  Impaired Mobility: Ambulates or transfers with assistive devices or assistance; Unable to ambulate or transer.: No  Nursing Judgement: No          Lines:   Peripheral IV 01/15/25 Left Antecubital (Active)   Site Assessment Clean, dry & intact 01/15/25 0918   Line Status Blood return noted;Normal saline locked;Flushed;Specimen collected 01/15/25 0918   Line Care Cap changed;Connections checked and tightened 01/15/25 0918   Phlebitis Assessment No symptoms 01/15/25 0918   Infiltration Assessment 0 01/15/25 0918   Dressing Status New dressing applied;Clean, dry & intact 01/15/25 0918   Dressing Type Transparent 01/15/25 0918   Dressing Intervention New 01/15/25 0918        Opportunity for questions and clarification was provided.      Patient transported with:  O2 @ 3lpm and Amy Bustamante RN  01/15/25 8534

## 2025-01-16 PROBLEM — J43.9 EMPHYSEMA OF LUNG (HCC): Status: ACTIVE | Noted: 2018-04-04

## 2025-01-16 LAB
ANION GAP SERPL CALC-SCNC: 13 MMOL/L (ref 7–16)
B PERT DNA SPEC QL NAA+PROBE: NOT DETECTED
BASOPHILS # BLD: 0.01 K/UL (ref 0–0.2)
BASOPHILS NFR BLD: 0.1 % (ref 0–2)
BORDETELLA PARAPERTUSSIS BY PCR: NOT DETECTED
BUN SERPL-MCNC: 13 MG/DL (ref 8–23)
C PNEUM DNA SPEC QL NAA+PROBE: NOT DETECTED
CALCIUM SERPL-MCNC: 9.5 MG/DL (ref 8.8–10.2)
CHLORIDE SERPL-SCNC: 104 MMOL/L (ref 98–107)
CO2 SERPL-SCNC: 25 MMOL/L (ref 20–29)
CREAT SERPL-MCNC: 0.7 MG/DL (ref 0.6–1.1)
DIFFERENTIAL METHOD BLD: ABNORMAL
EOSINOPHIL # BLD: 0.01 K/UL (ref 0–0.8)
EOSINOPHIL NFR BLD: 0.1 % (ref 0.5–7.8)
ERYTHROCYTE [DISTWIDTH] IN BLOOD BY AUTOMATED COUNT: 13.2 % (ref 11.9–14.6)
FLUAV SUBTYP SPEC NAA+PROBE: NOT DETECTED
FLUBV RNA SPEC QL NAA+PROBE: NOT DETECTED
GLUCOSE SERPL-MCNC: 94 MG/DL (ref 70–99)
HADV DNA SPEC QL NAA+PROBE: NOT DETECTED
HCOV 229E RNA SPEC QL NAA+PROBE: NOT DETECTED
HCOV HKU1 RNA SPEC QL NAA+PROBE: NOT DETECTED
HCOV NL63 RNA SPEC QL NAA+PROBE: NOT DETECTED
HCOV OC43 RNA SPEC QL NAA+PROBE: NOT DETECTED
HCT VFR BLD AUTO: 41 % (ref 35.8–46.3)
HGB BLD-MCNC: 13.7 G/DL (ref 11.7–15.4)
HMPV RNA SPEC QL NAA+PROBE: NOT DETECTED
HPIV1 RNA SPEC QL NAA+PROBE: NOT DETECTED
HPIV2 RNA SPEC QL NAA+PROBE: NOT DETECTED
HPIV3 RNA SPEC QL NAA+PROBE: NOT DETECTED
HPIV4 RNA SPEC QL NAA+PROBE: NOT DETECTED
IMM GRANULOCYTES # BLD AUTO: 0.04 K/UL (ref 0–0.5)
IMM GRANULOCYTES NFR BLD AUTO: 0.4 % (ref 0–5)
LYMPHOCYTES # BLD: 2.7 K/UL (ref 0.5–4.6)
LYMPHOCYTES NFR BLD: 24.9 % (ref 13–44)
M PNEUMO DNA SPEC QL NAA+PROBE: NOT DETECTED
MCH RBC QN AUTO: 31.4 PG (ref 26.1–32.9)
MCHC RBC AUTO-ENTMCNC: 33.4 G/DL (ref 31.4–35)
MCV RBC AUTO: 93.8 FL (ref 82–102)
MONOCYTES # BLD: 0.93 K/UL (ref 0.1–1.3)
MONOCYTES NFR BLD: 8.6 % (ref 4–12)
NEUTS SEG # BLD: 7.17 K/UL (ref 1.7–8.2)
NEUTS SEG NFR BLD: 65.9 % (ref 43–78)
NRBC # BLD: 0 K/UL (ref 0–0.2)
PLATELET # BLD AUTO: 187 K/UL (ref 150–450)
PMV BLD AUTO: 11.2 FL (ref 9.4–12.3)
POTASSIUM SERPL-SCNC: 4.8 MMOL/L (ref 3.5–5.1)
RBC # BLD AUTO: 4.37 M/UL (ref 4.05–5.2)
RSV RNA SPEC QL NAA+PROBE: NOT DETECTED
RV+EV RNA SPEC QL NAA+PROBE: NOT DETECTED
SARS-COV-2 RNA RESP QL NAA+PROBE: NOT DETECTED
SODIUM SERPL-SCNC: 142 MMOL/L (ref 136–145)
WBC # BLD AUTO: 10.9 K/UL (ref 4.3–11.1)

## 2025-01-16 PROCEDURE — 94761 N-INVAS EAR/PLS OXIMETRY MLT: CPT

## 2025-01-16 PROCEDURE — 97112 NEUROMUSCULAR REEDUCATION: CPT

## 2025-01-16 PROCEDURE — 80048 BASIC METABOLIC PNL TOTAL CA: CPT

## 2025-01-16 PROCEDURE — 96376 TX/PRO/DX INJ SAME DRUG ADON: CPT

## 2025-01-16 PROCEDURE — 97161 PT EVAL LOW COMPLEX 20 MIN: CPT

## 2025-01-16 PROCEDURE — 85025 COMPLETE CBC W/AUTO DIFF WBC: CPT

## 2025-01-16 PROCEDURE — G0378 HOSPITAL OBSERVATION PER HR: HCPCS

## 2025-01-16 PROCEDURE — 6360000002 HC RX W HCPCS: Performed by: STUDENT IN AN ORGANIZED HEALTH CARE EDUCATION/TRAINING PROGRAM

## 2025-01-16 PROCEDURE — 36415 COLL VENOUS BLD VENIPUNCTURE: CPT

## 2025-01-16 PROCEDURE — 94760 N-INVAS EAR/PLS OXIMETRY 1: CPT

## 2025-01-16 PROCEDURE — 2500000003 HC RX 250 WO HCPCS: Performed by: STUDENT IN AN ORGANIZED HEALTH CARE EDUCATION/TRAINING PROGRAM

## 2025-01-16 PROCEDURE — 97165 OT EVAL LOW COMPLEX 30 MIN: CPT

## 2025-01-16 PROCEDURE — 97530 THERAPEUTIC ACTIVITIES: CPT

## 2025-01-16 PROCEDURE — 6370000000 HC RX 637 (ALT 250 FOR IP): Performed by: STUDENT IN AN ORGANIZED HEALTH CARE EDUCATION/TRAINING PROGRAM

## 2025-01-16 PROCEDURE — 96372 THER/PROPH/DIAG INJ SC/IM: CPT

## 2025-01-16 PROCEDURE — 0202U NFCT DS 22 TRGT SARS-COV-2: CPT

## 2025-01-16 PROCEDURE — 99223 1ST HOSP IP/OBS HIGH 75: CPT | Performed by: INTERNAL MEDICINE

## 2025-01-16 RX ADMIN — GABAPENTIN 600 MG: 300 CAPSULE ORAL at 07:44

## 2025-01-16 RX ADMIN — GABAPENTIN 600 MG: 300 CAPSULE ORAL at 15:53

## 2025-01-16 RX ADMIN — WATER 1000 MG: 1 INJECTION INTRAMUSCULAR; INTRAVENOUS; SUBCUTANEOUS at 11:53

## 2025-01-16 RX ADMIN — BUPRENORPHINE HCL 8 MG: 2 TABLET SUBLINGUAL at 05:36

## 2025-01-16 RX ADMIN — AZITHROMYCIN DIHYDRATE 500 MG: 250 TABLET ORAL at 11:53

## 2025-01-16 RX ADMIN — GABAPENTIN 600 MG: 300 CAPSULE ORAL at 20:54

## 2025-01-16 RX ADMIN — SODIUM CHLORIDE, PRESERVATIVE FREE 5 ML: 5 INJECTION INTRAVENOUS at 20:54

## 2025-01-16 RX ADMIN — GUAIFENESIN 1200 MG: 600 TABLET ORAL at 07:44

## 2025-01-16 RX ADMIN — GUAIFENESIN 1200 MG: 600 TABLET ORAL at 20:54

## 2025-01-16 RX ADMIN — SODIUM CHLORIDE, PRESERVATIVE FREE 10 ML: 5 INJECTION INTRAVENOUS at 07:43

## 2025-01-16 RX ADMIN — ENOXAPARIN SODIUM 30 MG: 100 INJECTION SUBCUTANEOUS at 07:43

## 2025-01-16 RX ADMIN — BUPRENORPHINE HCL 8 MG: 2 TABLET SUBLINGUAL at 15:51

## 2025-01-16 RX ADMIN — ATORVASTATIN CALCIUM 20 MG: 20 TABLET, FILM COATED ORAL at 07:44

## 2025-01-16 ASSESSMENT — PAIN DESCRIPTION - DESCRIPTORS: DESCRIPTORS: ACHING

## 2025-01-16 ASSESSMENT — PAIN DESCRIPTION - LOCATION: LOCATION: HEAD

## 2025-01-16 ASSESSMENT — PAIN SCALES - GENERAL
PAINLEVEL_OUTOF10: 0
PAINLEVEL_OUTOF10: 5

## 2025-01-16 ASSESSMENT — PAIN DESCRIPTION - ORIENTATION: ORIENTATION: OTHER (COMMENT)

## 2025-01-16 NOTE — CONSULTS
History and Physical Initial Visit NOTE           1/16/2025    Jolynn Martines                        Date of Admission:  1/15/2025    The patient's chart is reviewed and the patient is discussed with the staff.    Subjective:     Patient is a 69 y.o.  female seen and evaluated at the request of Dr. Donnelly for hypoxia, emphysema. PMH includes CVA, cerebral aneurysm, anxiety, tobacco use. Smokes 5-10 cigs per day off/on for 40years. Denies any known lung disease however is prescribed Breo and uses PRN although not for awhile. Does not follow with a pulmonologist, no PFTs for review.    Presented to ER with worsening SOB, cough. She denies any fevers or sick contacts. She has had chills, hot flashes. She states cough started Monday and was non-productive, SOB started on Tuesday. Denies any wheezing. Found to be hypoxic with EMS. CXR with some RML infiltrate, negative for flu, RSV and COVID. Started on azithromycin and rocephin. Currently requiring 3lpm.    Review of Systems: Comprehensive ROS negative except in HPI    Current Outpatient Medications   Medication Instructions    albuterol sulfate  (90 Base) MCG/ACT inhaler 1 puff, Inhalation, EVERY 4 HOURS PRN    atorvastatin (LIPITOR) 20 MG tablet TAKE 1 TABLET EVERY DAY FOR HIGH CHOLESTEROL    buprenorphine (SUBUTEX) 8 MG SUBL SL tablet TAKE 2.5 TABLETS SUBLINGUAL DAILY. DNF 3/20/22    clonazePAM (KLONOPIN) 0.5 MG tablet 1/4 (one quarter) of a tablet Prn for sleep    gabapentin (NEURONTIN) 600 mg, Oral, 3 TIMES DAILY    Multiple Vitamins-Minerals (CENTRUM ADULTS PO) Oral, DAILY    tamsulosin (FLOMAX) 0.4 mg, Oral, DAILY    vitamin D 1,000 Units, Oral      Past Medical History:   Diagnosis Date    Anterior cerebral aneurysm 2018    above riat eye    Anxiety disorder     Back pain     Brain aneurysm     times  2 - one ruptured in surgery and caused a stroke     Cerebral hemorrhage (HCC)     Hematuria, microscopic     History of

## 2025-01-16 NOTE — PROGRESS NOTES
01/16/25 0947   Resting (Room Air)   SpO2 83   HR 98   Resting (On O2)   SpO2 91      O2 Device Nasal cannula   O2 Flow Rate (l/min) 2 l/min   During Walk (On O2)   SpO2 85      O2 Device Nasal cannula   O2 Flow Rate (l/min) 2 l/min   Need Additional O2 Flow Rate Rows Yes   O2 Flow Rate (l/min) 3 l/min   O2 Saturation 88   O2 Flow Rate (l/min) 4 l/min   O2 Saturation 91   After Walk   SpO2 93   HR 99   O2 Device Nasal cannula   O2 Flow Rate (l/min) 3 l/min

## 2025-01-16 NOTE — THERAPY EVALUATION
ACUTE OCCUPATIONAL THERAPY GOALS:   (Developed with and agreed upon by patient and/or caregiver.)  1) Patient will complete lower body bathing and dressing with INDEPENDENCE and adaptive equipment as needed.   2) Patient will complete toileting with INDEPENDENCE.   3) Patient will complete functional transfers with INDEPENDENCE and adaptive equipment as needed.   4) Patient will tolerate at least 25 minutes of OT activity with self directed rest breaks while maintaining O2 sats >90%.   5) Patient will verbalize at least 3 energy conservation technique to utilize during ADL/IADL.   Timeframe: 7 visits      OCCUPATIONAL THERAPY Initial Assessment and Daily Note       OT Visit Days: 1  Acknowledge Orders  Time  OT Charge Capture  Rehab Caseload Tracker      Jolynn Martines is a 69 y.o. female   PRIMARY DIAGNOSIS: Acute hypoxic respiratory failure  Acute respiratory failure with hypoxia [J96.01]  Pneumonia of right middle lobe due to infectious organism [J18.9]  Acute hypoxic respiratory failure [J96.01]       Reason for Referral: Generalized Muscle Weakness (M62.81)  Observation: Payor: [employer] MEDICARE / Plan: [employer] GOLD PLUS HMO / Product Type: *No Product type* /     ASSESSMENT:     REHAB RECOMMENDATIONS:   Recommendation to date pending progress:  Setting:  No further skilled occupational therapy after discharge from hospital  May benefit from HH RN due to new supplemental O2 needs     Equipment:    None     ASSESSMENT:  Ms. Martines is a 70 y/o female who presents with acute respiratory failure. PMHx of CVA with R sided weakness/coordination deficits. At baseline pt lives with her , is independent with ADLs/IADLs, and mobility.     This date, pt presented sitting edge of bed in OFL unit with nasal cannula off. Pt SpO2 noted to be 83% on room air. Replaced nasal cannula on 2L O2 and recovered to 90%. Pt overall CGAx1-2 for functional transfers and mobility of household distance with HHA. Coordinated with RT    Pre Treatment:    Did not c/o pain      Post Treatment: same       Vitals          Oxygen   Room air, 83%  2-3L at rest, 90%  4L with mobility >90%         GROSS EVALUATION: INTACT IMPAIRED   (See Comments)   UE AROM [x] []   UE PROM [x] []   Strength [x]       Posture / Balance [] Sitting - Static: Good  Sitting - Dynamic: Good  Standing - Static: Fair  Standing - Dynamic: Fair   Sensation []     Coordination []  Decreased at baseline due to hx of CVA     Tone []       Edema []    Activity Tolerance []  Decreased, limited by fatigue and O2 demands     Hand Dominance R [] L []      COGNITION/  PERCEPTION: INTACT IMPAIRED   (See Comments)   Orientation [x]     Vision [x]     Hearing [x]     Cognition  [x]     Perception []       MOBILITY: I Mod I S SBA CGA Min Mod Max Total  NT x2 Comments:   Bed Mobility    Rolling [] [] [] [] [] [] [] [] [] [x] []    Supine to Sit [] [] [] [] [] [] [] [] [] [x] []    Scooting [] [] [] [] [] [] [] [] [] [x] []    Sit to Supine [] [] [] [] [] [] [] [] [] [x] []    Transfers    Sit to Stand [] [] [] [] [x] [] [] [] [] [] []    Bed to Chair [] [] [] [] [] [] [] [] [] [x] []    Stand to Sit [] [] [] [] [x] [] [] [] [] [] []    Tub/Shower [] [] [] [] [] [] [] [] [] [x] []     Toilet [] [] [] [] [] [] [] [] [] [x] []      [] [] [] [] [] [] [] [] [] [] []    I=Independent, Mod I=Modified Independent, S=Supervision/Setup, SBA=Standby Assistance, CGA=Contact Guard Assistance, Min=Minimal Assistance, Mod=Moderate Assistance, Max=Maximal Assistance, Total=Total Assistance, NT=Not Tested    ACTIVITIES OF DAILY LIVING: I Mod I S SBA CGA Min Mod Max Total NT Comments   BASIC ADLs:              Upper Body Bathing  [] [] [] [] [] [] [] [] [] [x]     Lower Body Bathing [] [] [] [] [] [] [] [] [] [x]     Toileting [] [] [] [] [] [] [] [] [] [x]    Upper Body Dressing [] [] [] [] [] [] [] [] [] [x]    Lower Body Dressing [] [] [] [x] [] [] [] [] [] [] Donning shoes   Feeding [] [] [] [] [] [] [] []

## 2025-01-16 NOTE — DISCHARGE INSTRUCTIONS
[facility]  [address] [place] [address]   [phone]    The pulmonary office will call you in 1-2 business days to arrange follow up in the pulmonary office. If you have not heard from the office after 2 full business days, please call the office to arrange follow up.

## 2025-01-16 NOTE — CARE COORDINATION
Pt chart reviewed for discharge planning.  LMSW met with pt, verified demographic information/ health insurance.  Pt lives with her spouse and normally manages most ADL's.  PCP was confirmed, last seen 10/15/2024.  Pt reports that she may need home O2 but anticipates no other supportive care needs.  CM staff will follow pt plan of care and assist with supportive care referrals pending pt clinical progress.  Please consult case management if specific needs arise.      01/16/25 6833   Service Assessment   Patient Orientation Alert and Oriented   Cognition Alert   History Provided By Patient;Medical Record   Primary Caregiver Self   Support Systems Spouse/Significant Other   Patient's Healthcare Decision Maker is: Legal Next of Kin   PCP Verified by CM Yes   Last Visit to PCP Within last 3 months  (10/15/2024)   Prior Functional Level Independent in ADLs/IADLs   Current Functional Level Independent in ADLs/IADLs   Can patient return to prior living arrangement Yes   Ability to make needs known: Good   Family able to assist with home care needs: Yes   Would you like for me to discuss the discharge plan with any other family members/significant others, and if so, who? No   Financial Resources Medicare  ([employer])   Community Resources None   CM/SW Referral Other (see comment)  (none)   Social/Functional History   Lives With Spouse   Type of Home House   Home Layout One level   Home Access Stairs to enter with rails   Entrance Stairs - Number of Steps 2   Home Equipment None   Prior Level of Assist for ADLs Independent   Prior Level of Assist for Homemaking Independent   Prior Level of Assist for Transfers Independent   Active  Yes   Discharge Planning   Type of Residence House   Living Arrangements Spouse/Significant Other   Current Services Prior To Admission None   Potential Assistance Needed N/A   Potential Assistance Purchasing Medications No   Type of Home Care Services None   Patient expects to be discharged

## 2025-01-16 NOTE — PROGRESS NOTES
Hospitalist Progress Note   Admit Date:  1/15/2025  9:22 AM   Name:  Jolynn Martines   Age:  69 y.o.  Sex:  female  :  1955   MRN:  [id number]   Room:  [place] [address]    Presenting/Chief Complaint: Cough and Shortness of Breath     Reason(s) for Admission: Acute respiratory failure with hypoxia [J96.01]  Pneumonia of right middle lobe due to infectious organism [J18.9]  Acute hypoxic respiratory failure [J96.01]     Hospital Course:   Jolynn Martines is a 69 y.o. female with medical history of osteoporosis, CVA with hemiparesis side effects, tobacco use who presented with shortness of breath.  Patient states that symptoms started suddenly today and has been associated with nonproductive cough.  She does not use any home oxygen and does not follow-up with a pulmonologist.  Smokes about 5-10 sticks of cigarettes daily and has been smoking for over 30 years.  On presentation vitals are stable except for /68, O2 sat dropping to the high 80s on room air per ED physician.  Labs were WNL.  Chest x-ray shows hyperinflated lungs with airspace disease of right middle lobe and CT chest recommended.  Patient was placed on supplemental oxygen and admitted for further management.  Patient still hypoxic on walk test.  Pulmonology consult for further recs.      Subjective & 24hr Events:   Seen and examined at bedside.  No acute events overnight.  Endorses improvement in symptoms, denies any complaints.  Pulm consulted for further recs.  Patient states she is willing to quit smoking and is motivated to do so after this health episode.    Assessment & Plan:   Acute hypoxic respiratory failure  Pneumonia  On nasal cannula, continue weaning as tolerated  Chest x-ray shows hyperinflated lungs with airspace disease of right middle lobe and CT chest recommended  CT chest with no acute airspace abnormality, chronic changes, mild emphysema  Continue Rocephin, azithromycin  Breathing treatment  Mucinex for cough  Pulmonology

## 2025-01-16 NOTE — PROGRESS NOTES
ACUTE PHYSICAL THERAPY GOALS:   (Developed with and agreed upon by patient and/or caregiver.)    (1.) Jolynn Martines  will move from supine to sit and sit to supine  with INDEPENDENCE within 7 treatment day(s).    (2.) Jolynn Martines will transfer from bed to chair and chair to bed with INDEPENDENCE using the least restrictive device within 7 treatment day(s).    (3.) Jolynn Martines will ambulate with INDEPENDENCE for 300 feet with the least restrictive device within 7 treatment day(s).   (4.) Jolynn Martines will perform standing static and dynamic balance activities x 20 minutes with SUPERVISION to improve safety within 7 treatment day(s).  (5.) Jolynn Martines will ascend and descend 2 stairs using 1 hand rail(s) with SUPERVISION to improve functional mobility and safety within 7 treatment day(s).  (6.) Jolynn Martines will perform therapeutic exercises x 20 min for HEP with INDEPENDENCE to improve strength, endurance, and functional mobility within 7 treatment day(s).       PHYSICAL THERAPY Initial Assessment, Daily Note, and AM  (Link to Caseload Tracking: PT Visit Days : 1  Acknowledge Orders  Time In/Out  PT Charge Capture  Rehab Caseload Tracker    Jolynn Martines is a 69 y.o. female   PRIMARY DIAGNOSIS: Acute hypoxic respiratory failure  Acute respiratory failure with hypoxia [J96.01]  Pneumonia of right middle lobe due to infectious organism [J18.9]  Acute hypoxic respiratory failure [J96.01]       Reason for Referral: Generalized Muscle Weakness (M62.81)  Difficulty in walking, Not elsewhere classified (R26.2)  Observation: Payor: [employer] MEDICARE / Plan: [employer] GOLD PLUS HMO / Product Type: *No Product type* /     ASSESSMENT:     REHAB RECOMMENDATIONS:   Recommendation to date pending progress:  Setting:  Home Health Therapy    Equipment:    To Be Determined     ASSESSMENT:  Ms. Martines  is a 69 year old F who presents with respiratory failure, pneumonia. At baseline, pt is independent with  Care;Energy Conservation;Fall Prevention Strategies  Education Method: Verbal  Barriers to Learning: None  Education Outcome: Verbalized understanding    TIME IN/OUT:  Time In: 0846  Time Out: 0910  Minutes: 24    CITLALI SINGLETON PT

## 2025-01-17 VITALS
TEMPERATURE: 98.1 F | HEIGHT: 64 IN | WEIGHT: 110 LBS | DIASTOLIC BLOOD PRESSURE: 74 MMHG | HEART RATE: 84 BPM | OXYGEN SATURATION: 93 % | BODY MASS INDEX: 18.78 KG/M2 | RESPIRATION RATE: 18 BRPM | SYSTOLIC BLOOD PRESSURE: 129 MMHG

## 2025-01-17 PROCEDURE — G0378 HOSPITAL OBSERVATION PER HR: HCPCS

## 2025-01-17 PROCEDURE — 6370000000 HC RX 637 (ALT 250 FOR IP): Performed by: STUDENT IN AN ORGANIZED HEALTH CARE EDUCATION/TRAINING PROGRAM

## 2025-01-17 PROCEDURE — 2500000003 HC RX 250 WO HCPCS: Performed by: STUDENT IN AN ORGANIZED HEALTH CARE EDUCATION/TRAINING PROGRAM

## 2025-01-17 PROCEDURE — 6360000002 HC RX W HCPCS: Performed by: STUDENT IN AN ORGANIZED HEALTH CARE EDUCATION/TRAINING PROGRAM

## 2025-01-17 PROCEDURE — 1100000000 HC RM PRIVATE

## 2025-01-17 PROCEDURE — 96372 THER/PROPH/DIAG INJ SC/IM: CPT

## 2025-01-17 RX ORDER — NICOTINE 21 MG/24HR
1 PATCH, TRANSDERMAL 24 HOURS TRANSDERMAL DAILY
Qty: 30 PATCH | Refills: 3 | Status: SHIPPED | OUTPATIENT
Start: 2025-01-18

## 2025-01-17 RX ORDER — GUAIFENESIN 600 MG/1
1200 TABLET, EXTENDED RELEASE ORAL 2 TIMES DAILY
Qty: 20 TABLET | Refills: 0 | Status: SHIPPED | OUTPATIENT
Start: 2025-01-17 | End: 2025-01-22

## 2025-01-17 RX ADMIN — ENOXAPARIN SODIUM 30 MG: 100 INJECTION SUBCUTANEOUS at 07:46

## 2025-01-17 RX ADMIN — AZITHROMYCIN DIHYDRATE 500 MG: 250 TABLET ORAL at 10:55

## 2025-01-17 RX ADMIN — GABAPENTIN 600 MG: 300 CAPSULE ORAL at 09:50

## 2025-01-17 RX ADMIN — SODIUM CHLORIDE, PRESERVATIVE FREE 5 ML: 5 INJECTION INTRAVENOUS at 07:43

## 2025-01-17 RX ADMIN — ATORVASTATIN CALCIUM 20 MG: 20 TABLET, FILM COATED ORAL at 07:43

## 2025-01-17 RX ADMIN — GUAIFENESIN 1200 MG: 600 TABLET ORAL at 07:43

## 2025-01-17 RX ADMIN — BUPRENORPHINE HCL 8 MG: 2 TABLET SUBLINGUAL at 05:34

## 2025-01-17 RX ADMIN — WATER 1000 MG: 1 INJECTION INTRAMUSCULAR; INTRAVENOUS; SUBCUTANEOUS at 10:55

## 2025-01-17 NOTE — DISCHARGE SUMMARY
Hospitalist Discharge Summary   Admit Date:  1/15/2025  9:22 AM   DC Note date: 2025  Name:  Jolynn Martines   Age:  69 y.o.  Sex:  female  :  1955   MRN:  [id number]   Room:  [place] [address]  PCP:  Mallory Portillo MD    Presenting Complaint: Cough and Shortness of Breath     Initial Admission Diagnosis: Acute respiratory failure with hypoxia [J96.01]  Pneumonia of right middle lobe due to infectious organism [J18.9]  Acute hypoxic respiratory failure [J96.01]     Problem List for this Hospitalization (present on admission):    Principal Problem:    Acute hypoxic respiratory failure  Active Problems:    Pure hypercholesterolemia    Emphysema of lung (HCC)    Anxiety disorder    Cerebral aneurysm, nonruptured    Osteoporosis    Hemiparesis affecting dominant side as late effect of cerebrovascular accident (HCC)    Tobacco use    Pneumonia  Resolved Problems:    * No resolved hospital problems. *      Hospital Course:  Jolynn Martines is a 69 y.o. female with medical history of osteoporosis, CVA with hemiparesis side effects, tobacco use who presented with shortness of breath.  Patient states that symptoms started suddenly today and has been associated with nonproductive cough.  She does not use any home oxygen and does not follow-up with a pulmonologist.  Smokes about 5-10 sticks of cigarettes daily and has been smoking for over 30 years.  On presentation vitals are stable except for /68, O2 sat dropping to the high 80s on room air per ED physician.  Labs were WNL.  Chest x-ray shows hyperinflated lungs with airspace disease of right middle lobe and CT chest recommended.  Patient was placed on supplemental oxygen and admitted for further management.  Patient still hypoxic on walk test.  Pulmonology consulted and recommended outpatient follow up. Patient will require home oxygen on discharge. She also requests nicotine patches to aid with smoking cessation. Seen by PT/OT with home health recommended  Nucleic Acid Amplification         Influenza A/B, Molecular [[id number]] Collected: 01/15/25 0915    Order Status: Completed Specimen: Nasopharyngeal Updated: 01/15/25 0942     Influenza A, MEAGAN Not detected        Comment: Negative results do not preclude infection with influenza virus and should not be the sole basis of a patient treatment decision.        Influenza B, MEAGAN Not detected               All Labs from Last 24 Hrs:  Recent Results (from the past 24 hour(s))   Respiratory Panel, Molecular, with COVID-19 (Restricted: peds pts or suitable admitted adults)    Collection Time: 01/16/25 11:57 AM    Specimen: Nasopharyngeal   Result Value Ref Range    Adenovirus by PCR NOT DETECTED NOTDET      Coronavirus 229E by PCR NOT DETECTED NOTDET      Coronavirus HKU1 by PCR NOT DETECTED NOTDET      Coronavirus NL63 by PCR NOT DETECTED NOTDET      Coronavirus OC43 by PCR NOT DETECTED NOTDET      SARS-CoV-2, PCR NOT DETECTED NOTDET      Human Metapneumovirus by PCR NOT DETECTED NOTDET      Rhinovirus Enterovirus PCR NOT DETECTED NOTDET      Influenza A by PCR NOT DETECTED NOTDET      Influenza B PCR NOT DETECTED NOTDET      Parainfluenza 1 PCR NOT DETECTED NOTDET      Parainfluenza 2 PCR NOT DETECTED NOTDET      Parainfluenza 3 PCR NOT DETECTED NOTDET      Parainfluenza 4 PCR NOT DETECTED NOTDET      Respiratory Syncytial Virus by PCR NOT DETECTED NOTDET      Bordetella parapertussis by PCR NOT DETECTED NOTDET      Bordetella pertussis by PCR NOT DETECTED NOTDET      Chlamydophila Pneumonia PCR NOT DETECTED NOTDET      Mycoplasma pneumo by PCR NOT DETECTED NOTDET         Recent Labs     01/16/25  1157   COVID19 NOT DETECTED       Recent Vital Data:  Patient Vitals for the past 24 hrs:   Temp Pulse Resp BP SpO2   01/17/25 0756 -- 84 -- 129/74 --   01/17/25 0747 98.1 °F (36.7 °C) 84 18 129/74 93 %   01/17/25 0300 97.9 °F (36.6 °C) 67 18 106/64 92 %   01/16/25 2028 -- 78 20 -- 94 %   01/16/25 1900 97.5 °F (36.4 °C) 76 18

## 2025-01-17 NOTE — CARE COORDINATION
Pt is for discharge home today with spouse.  Pt has [employer] HMO so new home O2 orders sent to [facility] as they have ins contract.  Portable tank delivered to pt room prior to her D/C.  Referral called/sent to [facility] for follow up home care as ordered.  No additional CM orders received or supportive care needs expressed at this time.     01/17/25 8849   Service Assessment   Patient's Healthcare Decision Maker is: Legal Next of Kin   Social/Functional History   Lives With Spouse   Type of Home House   Home Layout One level   Home Access Stairs to enter with rails   Entrance Stairs - Number of Steps 2   Home Equipment None   Prior Level of Assist for ADLs Independent   Prior Level of Assist for Homemaking Independent   Prior Level of Assist for Transfers Independent   Active  Yes   Services At/After Discharge   Transition of Care Consult (CM Consult) Home Health;Discharge Planning;DME/Supply Assistance   Internal Home Health No   Reason Outside Agency Chosen Script used patient chose alternate agency  ([facility])   Services At/After Discharge Home Health;DME;OT;PT;Nursing services  (New home O2 with [facility] and [facility])   [place] Resource Information Provided? No   Mode of Transport at Discharge Other (see comment)  (spouse)   Confirm Follow Up Transport Family   Condition of Participation: Discharge Planning   The Plan for Transition of Care is related to the following treatment goals: Pt will return home with family, new home O2 and  supportive care.   The Patient and/or Patient Representative was provided with a Choice of Provider? Patient   The Patient and/Or Patient Representative agree with the Discharge Plan? Yes   Freedom of Choice list was provided with basic dialogue that supports the patient's individualized plan of care/goals, treatment preferences, and shares the quality data associated with the providers?  Yes

## 2025-01-17 NOTE — PROGRESS NOTES
CH ad CH Kwesi encountered Spouse in hallway. Spouse was looking or PT's room. CH helped Spouse find room. PT was in bed. CH introduced self. CH checked for unmet needs and offered support.    . Isabel Anderson M.Div.

## 2025-01-17 NOTE — PROGRESS NOTES
Discharge instructions, medication side effects sheet, follow up appointment and prescriptions reviewed and explained to the patient. Patient verbalizes understanding of instructions. A copy of discharge instructions and prescriptions  have been given to patient.  Opportunity for questions provided. IV removed. O2 delivered.

## 2025-01-17 NOTE — PROGRESS NOTES
Physician Progress Note      PATIENT:               NICOL ANDERSON  [facility] #:                  [id number]  :                       1955  ADMIT DATE:       1/15/2025 9:22 AM  DISCH DATE:  RESPONDING  PROVIDER #:        Sandi Donnelly MD          QUERY TEXT:    Patient admitted with pneumonia and respiratory failure. Noted documentation   of emphysema in pulmonology consult note on  . In order to support the   diagnosis of emphysema, please include additional clinical indicators in your   documentation.  Or please document if the diagnosis of emphysema has been   ruled out after further study.    The medical record reflects the following:  Risk Factors: age 69, 40-year smoker  Clinical Indicators: Patient admitted with hypoxia per pulm consult. Per PT   note, spo2 83% room air and required NC of 4L to maintain spo2. Per pulm   consult note, \"seen and evaluatedfor hypoxia, emphysema...Denies any known   lung disease however is prescribed Breo and uses PRN although not for awhile.   Does not follow with a pulmonologist, no PFTs for review.\"  Treatment: labs, imaging, pulm consult, Breo, IV decadron, mucinex, duoneb, NC   oxygen, spo2 monitoring  Options provided:  -- Emphysema present as evidenced by, Please document evidence.  -- Emphysema  was ruled out  -- Other - I will add my own diagnosis  -- Disagree - Not applicable / Not valid  -- Disagree - Clinically unable to determine / Unknown  -- Refer to Clinical Documentation Reviewer    PROVIDER RESPONSE TEXT:    Emphysema is present as evidenced by Hypoxia    Query created by: Pamela Hurtado on 2025 10:41 AM      Electronically signed by:  Sandi Donnelly MD 2025 10:55 AM

## 2025-01-20 ENCOUNTER — CARE COORDINATION (OUTPATIENT)
Dept: CARE COORDINATION | Facility: CLINIC | Age: 70
End: 2025-01-20

## 2025-01-20 ENCOUNTER — TELEPHONE (OUTPATIENT)
Dept: PULMONOLOGY | Age: 70
End: 2025-01-20

## 2025-01-20 DIAGNOSIS — J96.01 ACUTE HYPOXIC RESPIRATORY FAILURE: Primary | ICD-10-CM

## 2025-01-20 PROCEDURE — 1111F DSCHRG MED/CURRENT MED MERGE: CPT | Performed by: FAMILY MEDICINE

## 2025-01-20 NOTE — CARE COORDINATION
Care Transitions Note    Initial Call - Call within 2 business days of discharge: Yes    Patient Current Location:  Home: [address] [place] [id number]    Care Transition Nurse contacted the patient by telephone to perform post hospital discharge assessment, verified name and  as identifiers. Provided introduction to self, and explanation of the Care Transition Nurse role.     Patient: Jolynn Martines    Patient : 1955   MRN: [id number]    Reason for Admission: Acute respiratory failure with hypoxia  Discharge Date: 25  RURS: Readmission Risk Score: 8      Last Discharge Facility       Date Complaint Diagnosis Description Type Department Provider    1/15/25 Cough; Shortness of Breath Acute respiratory failure with hypoxia ... ED to Hosp-Admission (Discharged) (ADMITTED) [id number] Sandi Donnelly MD; KARLA Lugo.            Was this an external facility discharge? No    Additional needs identified to be addressed with provider   No needs identified             Method of communication with provider: none.    Patients top risk factors for readmission: medical condition-see care summary note for history and problems.    Interventions to address risk factors:   Review of patient management of conditions/medications: verbalized understanding.  Home Health: Tentative SOC on 2025-verified with home health   DME: oxygen in home; patient states they are monitoring oxygen saturations and encouraged to record them; concerned about oxygen in the home; safety reviewed, educated that they can call DME company for further instructions and if she does not need oxygen to call her provider to discuss further actions.    Care Summary Note:   Acute hypoxic respiratory failure  Active Problems:    Pure hypercholesterolemia    Emphysema of lung (HCC)    Anxiety disorder    Cerebral aneurysm, nonruptured    Osteoporosis    Hemiparesis affecting dominant side as late effect of cerebrovascular accident (HCC)

## 2025-01-23 ENCOUNTER — OFFICE VISIT (OUTPATIENT)
Dept: FAMILY MEDICINE CLINIC | Facility: CLINIC | Age: 70
End: 2025-01-23

## 2025-01-23 VITALS
WEIGHT: 104.5 LBS | BODY MASS INDEX: 17.84 KG/M2 | SYSTOLIC BLOOD PRESSURE: 173 MMHG | HEART RATE: 83 BPM | DIASTOLIC BLOOD PRESSURE: 94 MMHG | HEIGHT: 64 IN | OXYGEN SATURATION: 96 % | TEMPERATURE: 98.6 F

## 2025-01-23 DIAGNOSIS — Z09 HOSPITAL DISCHARGE FOLLOW-UP: Primary | ICD-10-CM

## 2025-01-23 DIAGNOSIS — I10 HYPERTENSION, UNSPECIFIED TYPE: ICD-10-CM

## 2025-01-23 DIAGNOSIS — J43.9 PULMONARY EMPHYSEMA, UNSPECIFIED EMPHYSEMA TYPE (HCC): ICD-10-CM

## 2025-01-23 DIAGNOSIS — Z72.0 TOBACCO USE: ICD-10-CM

## 2025-01-23 RX ORDER — ALBUTEROL SULFATE 90 UG/1
1 INHALANT RESPIRATORY (INHALATION) EVERY 4 HOURS PRN
Qty: 17 DEVICE | Refills: 3 | Status: SHIPPED | OUTPATIENT
Start: 2025-01-23

## 2025-01-23 ASSESSMENT — PATIENT HEALTH QUESTIONNAIRE - PHQ9
SUM OF ALL RESPONSES TO PHQ9 QUESTIONS 1 & 2: 0
1. LITTLE INTEREST OR PLEASURE IN DOING THINGS: NOT AT ALL
SUM OF ALL RESPONSES TO PHQ QUESTIONS 1-9: 0
SUM OF ALL RESPONSES TO PHQ QUESTIONS 1-9: 0
2. FEELING DOWN, DEPRESSED OR HOPELESS: NOT AT ALL
SUM OF ALL RESPONSES TO PHQ QUESTIONS 1-9: 0
SUM OF ALL RESPONSES TO PHQ QUESTIONS 1-9: 0

## 2025-01-23 NOTE — PROGRESS NOTES
limited by the accuracy and autoconversion of the software. The chart has been reviewed, but errors may still be present.

## 2025-01-24 ENCOUNTER — TELEPHONE (OUTPATIENT)
Dept: FAMILY MEDICINE CLINIC | Facility: CLINIC | Age: 70
End: 2025-01-24

## 2025-01-24 NOTE — TELEPHONE ENCOUNTER
Patient called and wants something for her cough. Patient stated she was seen by Dr. López on Thursday 1/23/25 and the cough is not doing any better. She wanted to know if something could be called into the pharmacy.    Pharmacy    [facility]/pharmacy #[id number] - [place], [place] - [address] - P [phone] - F [phone] [address] [facility] [address]  Phone: [phone]  Fax: [phone]

## 2025-01-27 ENCOUNTER — CARE COORDINATION (OUTPATIENT)
Dept: CARE COORDINATION | Facility: CLINIC | Age: 70
End: 2025-01-27

## 2025-01-27 NOTE — CARE COORDINATION
Care Transitions Note    Follow Up Call     Attempted to reach patient for transitions of care follow up.  Unable to reach patient.      Outreach Attempts:   HIPAA compliant voicemail left for patient.     Care Summary Note: Unable to reach. Per noted chart documentation has attended scheduled follow up appointments.     Follow Up Appointment:   Future Appointments         Provider Specialty Dept Phone    2/21/2025 11:15 AM (Arrive by 11:00 AM) [facility] PFT LAB Pulmonology [phone]    2/21/2025 1:40 PM (Arrive by 1:25 PM) Salena Reid APRN - NP Pulmonology [phone]    3/4/2025 1:00 PM Kenny López MD Family Medicine [phone]    8/15/2025 10:15 AM Tiffany Tenorio APRN - CNP Urology [phone]    10/16/2025 11:00 AM Mallory Portillo MD Family Medicine [phone]            Plan for follow-up call in 6-10 days based on severity of symptoms and risk factors. Plan for next call: self management-diet, hydration, exercise, follow up appointments.     Joanna Crabtree LPN

## 2025-01-28 NOTE — TELEPHONE ENCOUNTER
Please call patient: when a person stops smoking they often develop a worse cough for a little while because the lungs wake up and start to clear out junk. We don't want to suppress the cough because it is working to help the lungs do some healing. Mucinex (guaifenesin - over the counter) can help the secretions to clump together and be easier to cough up. And drinking plenty of water helps the process too.

## 2025-02-04 ENCOUNTER — CARE COORDINATION (OUTPATIENT)
Dept: CARE COORDINATION | Facility: CLINIC | Age: 70
End: 2025-02-04

## 2025-02-04 NOTE — CARE COORDINATION
Care Transitions Note    Follow Up Call     Attempted to reach patient for transitions of care follow up.  Unable to reach patient.      Outreach Attempts:   HIPAA compliant voicemail left for patient.     Care Summary Note: Unable to reach.     Follow Up Appointment:   Future Appointments         Provider Specialty Dept Phone    2/21/2025 11:15 AM (Arrive by 11:00 AM) PP PFT LAB Pulmonology [phone]    2/21/2025 1:40 PM (Arrive by 1:25 PM) Salena Reid APRN - NP Pulmonology [phone]    3/4/2025 1:00 PM Kenny López MD Family Medicine [phone]    8/15/2025 10:15 AM Tiffany Tenorio APRN - CNP Urology [phone]    10/16/2025 11:00 AM Mallory Portillo MD Family Medicine [phone]            Plan for follow-up call in 6-10 days based on severity of symptoms and risk factors. Plan for next call: self management-diet, hydration, exercise, follow up appointments.     Joanna Crabtree LPN

## 2025-02-11 ENCOUNTER — CARE COORDINATION (OUTPATIENT)
Dept: CARE COORDINATION | Facility: CLINIC | Age: 70
End: 2025-02-11

## 2025-02-11 NOTE — CARE COORDINATION
Care Transitions Note    Final Call     Attempted to reach patient for transitions of care follow up.  Unable to reach patient.      Outreach Attempts:   Multiple attempts to contact patient at phone numbers on file.   HIPAA compliant voicemail left for patient.     Patient closed (unable to reach patient) from the Care Transitions program on 02/11/2025.  Patient/family  unable to reach .      Handoff:   Patient was not referred to the ACM team due to unable to contact patient.      Care Summary Note: unable to reach.  No answer, no return call last 3 attempts were unsuccessful.     Assessments:  Care Transitions Subsequent and Final Call    Subsequent and Final Calls  Care Transitions Interventions  Other Interventions:              Upcoming Appointments:    Future Appointments         Provider Specialty Dept Phone    2/21/2025 11:15 AM (Arrive by 11:00 AM) [facility] PFT LAB Pulmonology [phone]    2/21/2025 1:40 PM (Arrive by 1:25 PM) Salena Reid APRN - NP Pulmonology [phone]    3/4/2025 1:00 PM Kenny López MD Family Medicine [phone]    8/15/2025 10:15 AM Tiffany Tenorio APRN - CNP Urology [phone]    10/16/2025 11:00 AM Mallory Portillo MD Family Medicine [phone]            Joanna Crabtree LPN

## 2025-02-21 ENCOUNTER — NURSE ONLY (OUTPATIENT)
Dept: PULMONOLOGY | Age: 70
End: 2025-02-21

## 2025-02-21 ENCOUNTER — OFFICE VISIT (OUTPATIENT)
Dept: PULMONOLOGY | Age: 70
End: 2025-02-21

## 2025-02-21 VITALS
BODY MASS INDEX: 18.44 KG/M2 | TEMPERATURE: 97.3 F | RESPIRATION RATE: 18 BRPM | HEIGHT: 64 IN | SYSTOLIC BLOOD PRESSURE: 152 MMHG | DIASTOLIC BLOOD PRESSURE: 88 MMHG | OXYGEN SATURATION: 97 % | HEART RATE: 61 BPM | WEIGHT: 108 LBS

## 2025-02-21 DIAGNOSIS — Z87.891 FORMER SMOKER: ICD-10-CM

## 2025-02-21 DIAGNOSIS — J44.9 STAGE 1 MILD COPD BY GOLD CLASSIFICATION (HCC): ICD-10-CM

## 2025-02-21 DIAGNOSIS — F17.211 CIGARETTE NICOTINE DEPENDENCE IN REMISSION: ICD-10-CM

## 2025-02-21 DIAGNOSIS — R06.02 SOB (SHORTNESS OF BREATH): Primary | ICD-10-CM

## 2025-02-21 DIAGNOSIS — R09.02 HYPOXEMIA: ICD-10-CM

## 2025-02-21 DIAGNOSIS — Z09 HOSPITAL DISCHARGE FOLLOW-UP: Primary | ICD-10-CM

## 2025-02-21 PROBLEM — Z72.0 TOBACCO USE: Status: RESOLVED | Noted: 2025-01-15 | Resolved: 2025-02-21

## 2025-02-21 PROBLEM — J18.9 PNEUMONIA: Status: RESOLVED | Noted: 2025-01-15 | Resolved: 2025-02-21

## 2025-02-21 PROBLEM — J96.01 ACUTE HYPOXIC RESPIRATORY FAILURE (HCC): Status: RESOLVED | Noted: 2025-01-15 | Resolved: 2025-02-21

## 2025-02-21 LAB
FEV 1 , POC: 1.86 L
FEV1 % PRED, POC: 81 %
FEV1/FVC, POC: NORMAL
FVC % PRED, POC: 93 %
FVC, POC: NORMAL

## 2025-02-21 ASSESSMENT — PULMONARY FUNCTION TESTS
FEV1_PERCENT_PREDICTED_POC: 81
FVC_PERCENT_PREDICTED_POC: 93

## 2025-02-21 NOTE — PATIENT INSTRUCTIONS
Welcome to [facility]. It was a pleasure meeting you and participating in your health care today!

## 2025-02-21 NOTE — PROGRESS NOTES
Name:  Jolynn Martines  YOB: 1955   MRN: [id number]      Office Visit: 2/21/2025       Assessment & Plan (Medical Decision Making)    Impression: 69 y.o. female here for hospital follow up after being admitted for likely COPD exacerbation.     1. Hospital discharge follow-up  Recovering well.  No longer needing supplemental oxygen.  Successfully quit smoking    - CO DISCHARGE MEDS RECONCILED W/ CURRENT OUTPATIENT MED LIST    2. Stage 1 mild COPD by GOLD classification (HCC)  Mild obstruction on complete PFTs with reduced DLCO suggestive of emphysema which was confirmed on CT chest.  She has very minimal symptoms and can continue as needed albuterol.  Will monitor complete PFTs at least annually and adjust inhaler therapy as needed.    3. Hypoxemia  Resting O2 saturations were 99% on RA.  While walking, lowest O2 reading was 94% and highest HR was 95.  Patient did not require supplemental oxygen with rest or exertion.  Orders placed to discontinue oxygen and will call DME to retrieve.    - DME - DURABLE MEDICAL EQUIPMENT    4. Cigarette nicotine dependence in remission  Congratulated her on her efforts in quitting.  She has no desire to smoke again    No orders of the defined types were placed in this encounter.        Procedures    CO DISCHARGE MEDS RECONCILED W/ CURRENT OUTPATIENT MED LIST    DME - DURABLE MEDICAL EQUIPMENT     Please discontinue oxygen, patient will no longer need these services.     Follow-up and Dispositions    Return in about 6 months (around 8/21/2025) for ravi Martino.       KOJO Carrasco - NP    No specialty comments available.  No problem-specific Assessment & Plan notes found for this encounter.            Collaborating physician is Cb Mays MD    _________________________________________________________________________    HISTORY OF PRESENT ILLNESS:    Ms. Jolynn Martines is a very pleasant 69 y.o. female who is seen at [facility] today for  Follow-Up from

## 2025-03-04 ENCOUNTER — OFFICE VISIT (OUTPATIENT)
Dept: FAMILY MEDICINE CLINIC | Facility: CLINIC | Age: 70
End: 2025-03-04

## 2025-03-04 VITALS
WEIGHT: 108 LBS | SYSTOLIC BLOOD PRESSURE: 132 MMHG | BODY MASS INDEX: 18.54 KG/M2 | HEART RATE: 80 BPM | DIASTOLIC BLOOD PRESSURE: 70 MMHG | RESPIRATION RATE: 16 BRPM

## 2025-03-04 DIAGNOSIS — I69.359 HEMIPLEGIA AND HEMIPARESIS FOLLOWING CEREBRAL INFARCTION AFFECTING UNSPECIFIED SIDE (HCC): ICD-10-CM

## 2025-03-04 DIAGNOSIS — Z00.00 MEDICARE ANNUAL WELLNESS VISIT, SUBSEQUENT: Primary | ICD-10-CM

## 2025-03-04 DIAGNOSIS — I10 HYPERTENSION, UNSPECIFIED TYPE: ICD-10-CM

## 2025-03-04 DIAGNOSIS — E78.00 PURE HYPERCHOLESTEROLEMIA, UNSPECIFIED: ICD-10-CM

## 2025-03-04 DIAGNOSIS — M21.371 RIGHT FOOT DROP: ICD-10-CM

## 2025-03-04 ASSESSMENT — PATIENT HEALTH QUESTIONNAIRE - PHQ9
1. LITTLE INTEREST OR PLEASURE IN DOING THINGS: NOT AT ALL
SUM OF ALL RESPONSES TO PHQ QUESTIONS 1-9: 0
SUM OF ALL RESPONSES TO PHQ QUESTIONS 1-9: 0
2. FEELING DOWN, DEPRESSED OR HOPELESS: NOT AT ALL
SUM OF ALL RESPONSES TO PHQ QUESTIONS 1-9: 0
SUM OF ALL RESPONSES TO PHQ QUESTIONS 1-9: 0

## 2025-03-04 NOTE — PATIENT INSTRUCTIONS
Learning About Being Active as an Older Adult  Why is being active important as you get older?     Being active is one of the best things you can do for your health. And it's never too late to start. Being active--or getting active, if you aren't already--has definite benefits. It can:  Give you more energy,  Keep your mind sharp.  Improve balance to reduce your risk of falls.  Help you manage chronic illness with fewer medicines.  No matter how old you are, how fit you are, or what health problems you have, there is a form of activity that will work for you. And the more physical activity you can do, the better your overall health will be.  What kinds of activity can help you stay healthy?  Being more active will make your daily activities easier. Physical activity includes planned exercise and things you do in daily life. There are four types of activity:  Aerobic.  Doing aerobic activity makes your heart and lungs strong.  Includes walking, dancing, and gardening.  Aim for at least 2½ hours spread throughout the week.  It improves your energy and can help you sleep better.  Muscle-strengthening.  This type of activity can help maintain muscle and strengthen bones.  Includes climbing stairs, using resistance bands, and lifting or carrying heavy loads.  Aim for at least twice a week.  It can help protect the knees and other joints.  Stretching.  Stretching gives you better range of motion in joints and muscles.  Includes upper arm stretches, calf stretches, and gentle yoga.  Aim for at least twice a week, preferably after your muscles are warmed up from other activities.  It can help you function better in daily life.  Balancing.  This helps you stay coordinated and have good posture.  Includes heel-to-toe walking, kaylan chi, and certain types of yoga.  Aim for at least 3 days a week.  It can reduce your risk of falling.  Even if you have a hard time meeting the recommendations, it's better to be more active

## 2025-03-04 NOTE — PROGRESS NOTES
Jolynn Martines (: 1955) is a 69 y.o. female, established patient, here for evaluation of the following chief complaint(s):  Medicare AWV and Follow-up Chronic Condition       ASSESSMENT/PLAN:  1. Hypertension, unspecified type  2. Hemiplegia and hemiparesis following cerebral infarction affecting unspecified side (HCC)  3. Pure hypercholesterolemia, unspecified  4. Right foot drop  5. Medicare annual wellness visit, subsequent    Blood pressure improved,  Encouraged her to get her  seen asap by pcp for intermittent episodes of confusion,  To return for followup chronic issues in October as planned  Medicare questionnaire    SUBJECTIVE/OBJECTIVE:  HPI  Hypertensive at previous visits, normotensive today,    Worried about her , had episode where he got very confused, and forgot how to drive, where he was.  Advised she get his  seen immediately.    Physical Exam  Vitals and nursing note reviewed.   Constitutional:       General: She is not in acute distress.     Appearance: Normal appearance. She is not ill-appearing.   HENT:      Head: Normocephalic and atraumatic.      Right Ear: External ear normal.      Left Ear: External ear normal.      Nose: Nose normal.      Mouth/Throat:      Mouth: Mucous membranes are dry.   Eyes:      Extraocular Movements: Extraocular movements intact.      Pupils: Pupils are equal, round, and reactive to light.   Cardiovascular:      Rate and Rhythm: Normal rate.      Pulses: Normal pulses.   Pulmonary:      Effort: Pulmonary effort is normal.      Breath sounds: Normal breath sounds.   Abdominal:      General: There is no distension.   Musculoskeletal:         General: Normal range of motion.      Cervical back: Normal range of motion and neck supple.   Skin:     General: Skin is warm and dry.   Neurological:      General: No focal deficit present.      Mental Status: She is alert and oriented to person, place, and time.   Psychiatric:         Mood and

## 2025-03-19 ENCOUNTER — TELEPHONE (OUTPATIENT)
Dept: FAMILY MEDICINE CLINIC | Facility: CLINIC | Age: 70
End: 2025-03-19

## 2025-03-19 NOTE — TELEPHONE ENCOUNTER
[facility] with [facility] called on behalf of patient. Nursing is discharging today but physical therapy is going to re-cert as the patient had a fall on 3/8 and broke right fourth finger.

## 2025-03-24 ENCOUNTER — TELEPHONE (OUTPATIENT)
Dept: FAMILY MEDICINE CLINIC | Facility: CLINIC | Age: 70
End: 2025-03-24

## 2025-03-24 NOTE — TELEPHONE ENCOUNTER
Patient would like to make you aware that she will be having surgery on 326/25 due to breaking her finger.

## 2025-04-15 RX ORDER — GABAPENTIN 600 MG/1
600 TABLET ORAL 3 TIMES DAILY
Qty: 270 TABLET | Refills: 3 | Status: SHIPPED | OUTPATIENT
Start: 2025-04-15 | End: 2026-04-10

## 2025-08-04 RX ORDER — ATORVASTATIN CALCIUM 20 MG/1
TABLET, FILM COATED ORAL
Qty: 90 TABLET | Refills: 3 | Status: SHIPPED | OUTPATIENT
Start: 2025-08-04

## (undated) DEVICE — GARMENT,MEDLINE,DVT,INT,CALF,MED, GEN2: Brand: MEDLINE